# Patient Record
Sex: MALE | Race: WHITE | NOT HISPANIC OR LATINO | Employment: FULL TIME | URBAN - METROPOLITAN AREA
[De-identification: names, ages, dates, MRNs, and addresses within clinical notes are randomized per-mention and may not be internally consistent; named-entity substitution may affect disease eponyms.]

---

## 2017-02-20 DIAGNOSIS — F90.0 ATTENTION DEFICIT HYPERACTIVITY DISORDER (ADHD), PREDOMINANTLY INATTENTIVE TYPE: Primary | ICD-10-CM

## 2017-02-20 NOTE — TELEPHONE ENCOUNTER
Patient called and left a msg requesting a refill of his Adderall - he did not spell his name or give  but I found him by phone.   He wanted to pick it up today - I called him back and left a msg that he must leave the spelling of his name and . Also the [facility] is not in until tomorrow.    He did not say for 3 months but it looks like that is what he has been getting.    HE WILL BE DUE FOR A OV BEFORE THE NEXT REFILL    Pending Prescriptions:                       Disp   Refills    amphetamine-dextroamphetamine (ADDERALL X*30 cap*0            Sig: Take 1 capsule (20 mg) by mouth daily    amphetamine-dextroamphetamine (ADDERALL X*30 cap*0            Sig: Take 1 capsule (20 mg) by mouth daily    amphetamine-dextroamphetamine (ADDERALL X*30 cap*0            Sig: Take 1 capsule (20 mg) by mouth daily    Please return to me and I will call the patient back at [phone]. Thank-You,  Olga

## 2017-02-21 RX ORDER — DEXTROAMPHETAMINE SACCHARATE, AMPHETAMINE ASPARTATE MONOHYDRATE, DEXTROAMPHETAMINE SULFATE AND AMPHETAMINE SULFATE 5; 5; 5; 5 MG/1; MG/1; MG/1; MG/1
20 CAPSULE, EXTENDED RELEASE ORAL DAILY
Qty: 30 CAPSULE | Refills: 0 | Status: SHIPPED | OUTPATIENT
Start: 2017-03-23 | End: 2017-04-22

## 2017-02-21 RX ORDER — DEXTROAMPHETAMINE SACCHARATE, AMPHETAMINE ASPARTATE MONOHYDRATE, DEXTROAMPHETAMINE SULFATE AND AMPHETAMINE SULFATE 5; 5; 5; 5 MG/1; MG/1; MG/1; MG/1
20 CAPSULE, EXTENDED RELEASE ORAL DAILY
Qty: 30 CAPSULE | Refills: 0 | Status: SHIPPED | OUTPATIENT
Start: 2017-04-23 | End: 2017-05-23

## 2017-02-21 RX ORDER — DEXTROAMPHETAMINE SACCHARATE, AMPHETAMINE ASPARTATE MONOHYDRATE, DEXTROAMPHETAMINE SULFATE AND AMPHETAMINE SULFATE 5; 5; 5; 5 MG/1; MG/1; MG/1; MG/1
20 CAPSULE, EXTENDED RELEASE ORAL DAILY
Qty: 30 CAPSULE | Refills: 0 | Status: ON HOLD | OUTPATIENT
Start: 2017-02-21 | End: 2017-07-07

## 2017-04-13 NOTE — TELEPHONE ENCOUNTER
Pt was only given one month. The other 2 months were still in the Rx book. They are still in the book to be picked up if needed.

## 2017-07-05 ENCOUNTER — OFFICE VISIT (OUTPATIENT)
Dept: FAMILY MEDICINE | Facility: CLINIC | Age: 27
End: 2017-07-05

## 2017-07-05 VITALS
OXYGEN SATURATION: 98 % | WEIGHT: 190 LBS | TEMPERATURE: 98 F | RESPIRATION RATE: 16 BRPM | HEART RATE: 76 BPM | SYSTOLIC BLOOD PRESSURE: 120 MMHG | BODY MASS INDEX: 26.6 KG/M2 | HEIGHT: 71 IN | DIASTOLIC BLOOD PRESSURE: 80 MMHG

## 2017-07-05 DIAGNOSIS — R10.84 ABDOMINAL PAIN, GENERALIZED: Primary | ICD-10-CM

## 2017-07-05 DIAGNOSIS — Z71.89 ACP (ADVANCE CARE PLANNING): ICD-10-CM

## 2017-07-05 DIAGNOSIS — F90.0 ATTENTION DEFICIT HYPERACTIVITY DISORDER (ADHD), PREDOMINANTLY INATTENTIVE TYPE: Primary | ICD-10-CM

## 2017-07-05 LAB
ERYTHROCYTE [DISTWIDTH] IN BLOOD BY AUTOMATED COUNT: 11.5 %
HCT VFR BLD AUTO: 49.4 % (ref 40–53)
HEMOGLOBIN: 17.4 G/DL (ref 13.3–17.7)
MCH RBC QN AUTO: 32 PG (ref 26–33)
MCHC RBC AUTO-ENTMCNC: 35.2 G/DL (ref 31–36)
MCV RBC AUTO: 90.8 FL (ref 78–100)
PLATELET COUNT - QUEST: 235 10^9/L (ref 150–375)
RBC # BLD AUTO: 5.44 10*12/L (ref 4.4–5.9)
WBC # BLD AUTO: 11.4 10*9/L (ref 4–11)

## 2017-07-05 PROCEDURE — 99213 OFFICE O/P EST LOW 20 MIN: CPT | Performed by: PHYSICIAN ASSISTANT

## 2017-07-05 PROCEDURE — 85027 COMPLETE CBC AUTOMATED: CPT | Performed by: PHYSICIAN ASSISTANT

## 2017-07-05 PROCEDURE — 36415 COLL VENOUS BLD VENIPUNCTURE: CPT | Performed by: PHYSICIAN ASSISTANT

## 2017-07-05 NOTE — TELEPHONE ENCOUNTER
Pt was given Adderall refills back in Feb and only received 1 of the 3 rx's (Please see note in chart) Pt was in for another appointment and asked about it so I checked the book and they are no longer there and there were NOT picked up. Can you please ok those 2 months. Pt is a wear that you are out of the office until next Monday the 10th    Pending Prescriptions:                       Disp   Refills    amphetamine-dextroamphetamine (ADDERALL X*30 cap*0            Sig: Take 1 capsule (20 mg) by mouth daily    amphetamine-dextroamphetamine (ADDERALL X*30 cap*0            Sig: Take 1 capsule (30 mg) by mouth daily    Please return to me and I will call the patient back at   Telephone Information:   Mobile [phone]   Mobile [phone]     . Thank-You,  Olga

## 2017-07-05 NOTE — MR AVS SNAPSHOT
After Visit Summary   7/5/2017    Bassam Jacobsen    MRN: [id number]           Patient Information     Date Of Birth          1990        Visit Information        Provider Department      7/5/2017 3:30 PM Diana Irvin PA-C [facility]        Today's Diagnoses     Abdominal pain, generalized    -  1    ACP (advance care planning)           Follow-ups after your visit        Follow-up notes from your care team     Return if symptoms worsen or fail to improve.      Who to contact     If you have questions or need follow up information about today's clinic visit or your schedule please contact [facility] directly at [phone].  Normal or non-critical lab and imaging results will be communicated to you by MyChart, letter or phone within 4 business days after the clinic has received the results. If you do not hear from us within 7 days, please contact the clinic through [facility] or phone. If you have a critical or abnormal lab result, we will notify you by phone as soon as possible.  Submit refill requests through [facility] or call your pharmacy and they will forward the refill request to us. Please allow 3 business days for your refill to be completed.          Additional Information About Your Visit        MyChart Information     [facility] gives you secure access to your electronic health record. If you see a primary care provider, you can also send messages to your care team and make appointments. If you have questions, please call your primary care clinic.  If you do not have a primary care provider, please call [phone] and they will assist you.        Care EveryWhere ID     This is your Care EveryWhere ID. This could be used by other organizations to access your [place] medical records  [id number]        Your Vitals Were     Pulse Temperature Respirations Height Pulse Oximetry BMI (Body Mass Index)    76 98  F (36.7  C) 16 1.791 m (5'  "10.5\") 98% 26.88 kg/m2       Blood Pressure from Last 3 Encounters:   07/05/17 120/80   10/26/16 112/80   06/24/15 120/80    Weight from Last 3 Encounters:   07/05/17 86.2 kg (190 lb)   10/26/16 83.5 kg (184 lb)   06/24/15 83.5 kg (184 lb)              We Performed the Following     HEMOGRAM/PLATELET (BFP)     VENOUS COLLECTION        Primary Care Provider Office Phone # Fax #    Morgan Valdez [phone]       [facility] [address]  [facility] [address]        Equal Access to Services     HERMILA LÓPEZ : Hadii yonis lr hadasho Soomaali, waaxda luqadaha, qaybta kaalmada adeegyada, stefano dee . So [facility] [phone].    ATENCIÓN: Si habla español, tiene a hare disposición servicios gratuitos de asistencia lingüística. Llame al [phone].    We comply with applicable federal civil rights laws and [place] laws. We do not discriminate on the basis of race, color, national origin, age, disability sex, sexual orientation or gender identity.            Thank you!     Thank you for choosing [facility]  for your care. Our goal is always to provide you with excellent care. Hearing back from our patients is one way we can continue to improve our services. Please take a few minutes to complete the written survey that you may receive in the mail after your visit with us. Thank you!             Your Updated Medication List - Protect others around you: Learn how to safely use, store and throw away your medicines at www.disposemymeds.org.          This list is accurate as of: 7/5/17  4:48 PM.  Always use your most recent med list.                   Brand Name Dispense Instructions for use Diagnosis    amphetamine-dextroamphetamine 20 MG per 24 hr capsule    ADDERALL XR    30 capsule    Take 1 capsule (20 mg) by mouth daily    Attention deficit hyperactivity disorder (ADHD), predominantly inattentive type         "

## 2017-07-05 NOTE — PROGRESS NOTES
"CC: Abdominal pain    History:  Ady is here today after having frequent BMs since Sunday. Small amounts of loose stool. This seems to be getting better though, as he has had only had 1 BM today. No blood. Would get rumbling and cramping leading up to BM, and then sometimes it would get better after BM and other times it would get worse. Feels like even having a cracker he would have to go to the bathroom.     Today tried to eat breakfast today, and go to work, but had to clock out of work due to the pain. Went home and ate a bite of chicken, some beans/rice, and some crackers, and quickly after that he threw up.     No fever, sweats, chills.     Tried antacid tablets at work. Wife bought him PeptoBismol but hasn't tried yet.     PMH, MEDICATIONS, ALLERGIES, SOCIAL AND FAMILY HISTORY in [facility] and reviewed by me personally.      ROS negative other than the symptoms noted above in the HPI.        Examination   /80 (BP Location: Left arm, Cuff Size: Adult Large)  Pulse 76  Temp 98  F (36.7  C)  Resp 16  Ht 1.791 m (5' 10.5\")  Wt 86.2 kg (190 lb)  SpO2 98%  BMI 26.88 kg/m2       Constitutional: Sitting comfortably, in no acute distress. Vital signs noted  Eyes: pupils equal round reactive to light and accomodation, extra ocular movements intact  Cardiovascular:  regular rate and rhythm, no murmurs, clicks, or gallops  Respiratory:  normal respiratory rate and rhythm, lungs clear to auscultation  Abdomen: Abdomen soft, mild to moderate tenderness over lower abdomen. McBurneys point positive. Psoas sign negative. Rovsings sign positive. BS normal. No masses, organomegaly  SKIN: No jaundice/pallor/rash.   Psychiatric: mentation appears normal and affect normal/bright        A/P    ICD-10-CM    1. Abdominal pain, generalized R10.84 HEMOGRAM/PLATELET (BFP)     VENOUS COLLECTION   2. ACP (advance care planning) Z71.89        DISCUSSION: CBC today is within normal limits, other than very mildly elevated WBC count. " Given recent diarrhea this is more consistent with a infectious process, however, I did warn Ady that this could be early appendicitis. For infectious process recommended clear fluids for 24 hours. If his symptoms worsen where he is feel hot or getting more abdominal pain especially in right lower abdomen he should go immediately to ER. He will contact me first thing tomorrow if not getting better, and can consider CT to r/o appendicitis.    follow up visit: As needed    [facility]

## 2017-07-05 NOTE — NURSING NOTE
Patient is here for abd pain that started on Monday - lower abd pain - No pain with urination or frequency - Sunday evening he went out to eat with friends and after that he had to have bowel movements frequently and they were loss but never  much at all. Also vomited today after eating    Pre-Visit Screening :  Immunizations : up to date  Asthma Action Plan/Test : na  PHQ9/GAD7 : na    Pulse - regular  My Chart - accepts    CLASSIFICATION OF OVERWEIGHT AND OBESITY BY BMI                         Obesity Class           BMI(kg/m2)  Underweight                                    < 18.5  Normal                                         18.5-24.9  Overweight                                     25.0-29.9  OBESITY                     I                  30.0-34.9                              II                 35.0-39.9  EXTREME OBESITY             III                >40                             Patient's  BMI Body mass index is 26.88 kg/(m^2).  http://hin.nhlbi.nih.gov/menuplanner/menu.cgi  Questioned patient about current smoking habits.  Pt. has never smoked.

## 2017-07-05 NOTE — LETTER
[facility]  [facility] [address]  [place]  [address]  [phone]              Return to Work Release    Date: 7/5/2017      Name: Bassam Jacobsen                       YOB: 1990        The patient was seen at [facility]    Please excuse from all work responsibilities 7/5/2017 and 7/6/2017.    He may return to work without restrictions 7/7/2017.                _________________________  Diana Irvin PA-C

## 2017-07-06 ENCOUNTER — TELEPHONE (OUTPATIENT)
Dept: FAMILY MEDICINE | Facility: CLINIC | Age: 27
End: 2017-07-06

## 2017-07-06 ENCOUNTER — ANESTHESIA (OUTPATIENT)
Dept: SURGERY | Facility: CLINIC | Age: 27
DRG: 340 | End: 2017-07-06
Payer: COMMERCIAL

## 2017-07-06 ENCOUNTER — TRANSFERRED RECORDS (OUTPATIENT)
Dept: HEALTH INFORMATION MANAGEMENT | Facility: CLINIC | Age: 27
End: 2017-07-06

## 2017-07-06 ENCOUNTER — ANESTHESIA EVENT (OUTPATIENT)
Dept: SURGERY | Facility: CLINIC | Age: 27
DRG: 340 | End: 2017-07-06
Payer: COMMERCIAL

## 2017-07-06 ENCOUNTER — HOSPITAL ENCOUNTER (INPATIENT)
Facility: CLINIC | Age: 27
LOS: 2 days | Discharge: HOME | DRG: 340 | End: 2017-07-09
Attending: PHYSICIAN ASSISTANT | Admitting: SURGERY
Payer: COMMERCIAL

## 2017-07-06 DIAGNOSIS — K35.32 ACUTE PERFORATED APPENDICITIS: Primary | ICD-10-CM

## 2017-07-06 DIAGNOSIS — K35.30 ACUTE APPENDICITIS WITH LOCALIZED PERITONITIS: ICD-10-CM

## 2017-07-06 PROCEDURE — 99222 1ST HOSP IP/OBS MODERATE 55: CPT | Mod: 57 | Performed by: SURGERY

## 2017-07-06 PROCEDURE — 71000013 ZZH RECOVERY PHASE 1 LEVEL 1 EA ADDTL HR: Performed by: SURGERY

## 2017-07-06 PROCEDURE — 25000566 ZZH SEVOFLURANE, EA 15 MIN: Performed by: SURGERY

## 2017-07-06 PROCEDURE — 0DTJ4ZZ RESECTION OF APPENDIX, PERCUTANEOUS ENDOSCOPIC APPROACH: ICD-10-PCS | Performed by: SURGERY

## 2017-07-06 PROCEDURE — 40000306 ZZH STATISTIC PRE PROC ASSESS II: Performed by: SURGERY

## 2017-07-06 PROCEDURE — 25000128 H RX IP 250 OP 636: Performed by: EMERGENCY MEDICINE

## 2017-07-06 PROCEDURE — 27210794 ZZH OR GENERAL SUPPLY STERILE: Performed by: SURGERY

## 2017-07-06 PROCEDURE — 37000009 ZZH ANESTHESIA TECHNICAL FEE, EACH ADDTL 15 MIN: Performed by: SURGERY

## 2017-07-06 PROCEDURE — 71000012 ZZH RECOVERY PHASE 1 LEVEL 1 FIRST HR: Performed by: SURGERY

## 2017-07-06 PROCEDURE — 96375 TX/PRO/DX INJ NEW DRUG ADDON: CPT

## 2017-07-06 PROCEDURE — 36000056 ZZH SURGERY LEVEL 3 1ST 30 MIN: Performed by: SURGERY

## 2017-07-06 PROCEDURE — 25000128 H RX IP 250 OP 636: Performed by: NURSE ANESTHETIST, CERTIFIED REGISTERED

## 2017-07-06 PROCEDURE — 25000125 ZZHC RX 250: Performed by: NURSE ANESTHETIST, CERTIFIED REGISTERED

## 2017-07-06 PROCEDURE — 37000008 ZZH ANESTHESIA TECHNICAL FEE, 1ST 30 MIN: Performed by: SURGERY

## 2017-07-06 PROCEDURE — 96374 THER/PROPH/DIAG INJ IV PUSH: CPT

## 2017-07-06 PROCEDURE — 36000058 ZZH SURGERY LEVEL 3 EA 15 ADDTL MIN: Performed by: SURGERY

## 2017-07-06 PROCEDURE — 25000128 H RX IP 250 OP 636: Performed by: PHYSICIAN ASSISTANT

## 2017-07-06 PROCEDURE — 99285 EMERGENCY DEPT VISIT HI MDM: CPT

## 2017-07-06 PROCEDURE — 25000128 H RX IP 250 OP 636: Performed by: ANESTHESIOLOGY

## 2017-07-06 PROCEDURE — 44970 LAPAROSCOPY APPENDECTOMY: CPT | Performed by: SURGERY

## 2017-07-06 RX ORDER — LIDOCAINE HYDROCHLORIDE 10 MG/ML
INJECTION, SOLUTION INFILTRATION; PERINEURAL PRN
Status: DISCONTINUED | OUTPATIENT
Start: 2017-07-06 | End: 2017-07-07

## 2017-07-06 RX ORDER — ONDANSETRON 2 MG/ML
4 INJECTION INTRAMUSCULAR; INTRAVENOUS ONCE
Status: COMPLETED | OUTPATIENT
Start: 2017-07-06 | End: 2017-07-06

## 2017-07-06 RX ORDER — SODIUM CHLORIDE, SODIUM LACTATE, POTASSIUM CHLORIDE, CALCIUM CHLORIDE 600; 310; 30; 20 MG/100ML; MG/100ML; MG/100ML; MG/100ML
INJECTION, SOLUTION INTRAVENOUS CONTINUOUS
Status: DISCONTINUED | OUTPATIENT
Start: 2017-07-06 | End: 2017-07-07 | Stop reason: HOSPADM

## 2017-07-06 RX ORDER — DEXAMETHASONE SODIUM PHOSPHATE 4 MG/ML
INJECTION, SOLUTION INTRA-ARTICULAR; INTRALESIONAL; INTRAMUSCULAR; INTRAVENOUS; SOFT TISSUE PRN
Status: DISCONTINUED | OUTPATIENT
Start: 2017-07-06 | End: 2017-07-07

## 2017-07-06 RX ORDER — ONDANSETRON 2 MG/ML
INJECTION INTRAMUSCULAR; INTRAVENOUS PRN
Status: DISCONTINUED | OUTPATIENT
Start: 2017-07-06 | End: 2017-07-07

## 2017-07-06 RX ORDER — FENTANYL CITRATE 50 UG/ML
INJECTION, SOLUTION INTRAMUSCULAR; INTRAVENOUS PRN
Status: DISCONTINUED | OUTPATIENT
Start: 2017-07-06 | End: 2017-07-07

## 2017-07-06 RX ORDER — HYDROMORPHONE HYDROCHLORIDE 1 MG/ML
.5-1 INJECTION, SOLUTION INTRAMUSCULAR; INTRAVENOUS; SUBCUTANEOUS
Status: DISCONTINUED | OUTPATIENT
Start: 2017-07-06 | End: 2017-07-07

## 2017-07-06 RX ORDER — LIDOCAINE 40 MG/G
CREAM TOPICAL
Status: DISCONTINUED | OUTPATIENT
Start: 2017-07-06 | End: 2017-07-07 | Stop reason: HOSPADM

## 2017-07-06 RX ORDER — GLYCOPYRROLATE 0.2 MG/ML
INJECTION, SOLUTION INTRAMUSCULAR; INTRAVENOUS PRN
Status: DISCONTINUED | OUTPATIENT
Start: 2017-07-06 | End: 2017-07-07

## 2017-07-06 RX ORDER — PROPOFOL 10 MG/ML
INJECTION, EMULSION INTRAVENOUS PRN
Status: DISCONTINUED | OUTPATIENT
Start: 2017-07-06 | End: 2017-07-07

## 2017-07-06 RX ADMIN — GLYCOPYRROLATE 0.2 MG: 0.2 INJECTION, SOLUTION INTRAMUSCULAR; INTRAVENOUS at 23:33

## 2017-07-06 RX ADMIN — SODIUM CHLORIDE, POTASSIUM CHLORIDE, SODIUM LACTATE AND CALCIUM CHLORIDE: 600; 310; 30; 20 INJECTION, SOLUTION INTRAVENOUS at 22:50

## 2017-07-06 RX ADMIN — LIDOCAINE HYDROCHLORIDE 30 MG: 10 INJECTION, SOLUTION INFILTRATION; PERINEURAL at 23:33

## 2017-07-06 RX ADMIN — DEXAMETHASONE SODIUM PHOSPHATE 4 MG: 4 INJECTION, SOLUTION INTRA-ARTICULAR; INTRALESIONAL; INTRAMUSCULAR; INTRAVENOUS; SOFT TISSUE at 23:33

## 2017-07-06 RX ADMIN — PROPOFOL 200 MG: 10 INJECTION, EMULSION INTRAVENOUS at 23:33

## 2017-07-06 RX ADMIN — ONDANSETRON 4 MG: 2 INJECTION INTRAMUSCULAR; INTRAVENOUS at 21:22

## 2017-07-06 RX ADMIN — ROCURONIUM BROMIDE 50 MG: 10 INJECTION INTRAVENOUS at 23:33

## 2017-07-06 RX ADMIN — ONDANSETRON 4 MG: 2 INJECTION INTRAMUSCULAR; INTRAVENOUS at 23:52

## 2017-07-06 RX ADMIN — Medication 1 MG: at 21:22

## 2017-07-06 RX ADMIN — TAZOBACTAM SODIUM AND PIPERACILLIN SODIUM 3.38 G: 375; 3 INJECTION, SOLUTION INTRAVENOUS at 22:22

## 2017-07-06 RX ADMIN — FENTANYL CITRATE 250 MCG: 50 INJECTION, SOLUTION INTRAMUSCULAR; INTRAVENOUS at 23:33

## 2017-07-06 ASSESSMENT — ENCOUNTER SYMPTOMS
ABDOMINAL PAIN: 1
FEVER: 1
DIARRHEA: 1

## 2017-07-06 NOTE — IP AVS SNAPSHOT
[facility]    [address]    [facility] [address]    Phone:  [phone]    Fax:  [phone]                                       After Visit Summary   7/6/2017    Bassam Jacobsen    MRN: [id number]           After Visit Summary Signature Page     I have received my discharge instructions, and my questions have been answered. I have discussed any challenges I see with this plan with the nurse or doctor.    ..........................................................................................................................................  Patient/Patient Representative Signature      ..........................................................................................................................................  Patient Representative Print Name and Relationship to Patient    ..................................................               ................................................  Date                                            Time    ..........................................................................................................................................  Reviewed by Signature/Title    ...................................................              ..............................................  Date                                                            Time

## 2017-07-06 NOTE — TELEPHONE ENCOUNTER
Bassam is calling stating that he vomited yesterday after he saw you but was able to keep some soup and some water down.  He would like you to call and speak with him when you can today    Patient's phone #[phone]

## 2017-07-06 NOTE — IP AVS SNAPSHOT
MRN:[id number]                      After Visit Summary   7/6/2017    Bassam Jacobsen    MRN: [id number]           Thank you!     Thank you for choosing [facility] for your care. Our goal is always to provide you with excellent care. Hearing back from our patients is one way we can continue to improve our services. Please take a few minutes to complete the written survey that you may receive in the mail after you visit. If you would like to speak to someone directly about your visit please contact Patient Relations at [phone]. Thank you!          Patient Information     Date Of Birth          1990        Designated Caregiver       Most Recent Value    Caregiver    Will someone help with your care after discharge? no      About your hospital stay     You were admitted on:  July 7, 2017 You last received care in the:  [facility]    You were discharged on:  July 9, 2017       Who to Call     For medical emergencies, please call 911.  For non-urgent questions about your medical care, please call your primary care provider or clinic, [phone]  For questions related to your surgery, please call your surgery clinic        Attending Provider     Provider Specialty    Olga Josue PA-C Physician Assistant    Christiano Swain MD General Surgery       Primary Care Provider Office Phone # Fax #    Morgan Terrance Valdez [phone]      Further instructions from your care team       HOME CARE FOLLOWING APPENDECTOMY  IRAJ Pope, YOANDY Silverio D. Maurer, ADRIÁN Mcbride    INCISIONAL CARE:  Replace the bandage over your incision (or incisions) until all drainage stops, or if more comfortable to have in place.  If present, leave the steri-strips (white paper tapes) in place for 14 days after surgery.  If you have staples in your incision at the time of discharge, they will be removed at your  follow-up appointment.  If Dermabond (a type of skin glue) is present, leave in place until it wears/flakes off.     BATHING:  Avoid baths for 1 week after surgery.  Showers are okay.  You may wash your hair at any time.  Gently pat your incision dry after bathing.    ACTIVITY:  Light Activity -- you may immediately be up and about as tolerated.  Driving -- you may drive when comfortable and off narcotic pain medications.  Light Work -- resume when comfortable off pain medications.  (If you can drive, you probably can work.)  Strenuous Work/Activity -- limit lifting to 20 pounds for 1 week.  Progressively increase with time.  Active Sports (running, biking, etc.) -- cautiously resume after 2 weeks.    DISCOMFORT:  Use pain medications as prescribed by your surgeon.  Take the pain medication with some food, when possible, to minimize side effects.  Intermittent use of ice packs at the incision sites may help during the first 48 hours.  Expect gradual improvement.    DIET:  No restrictions.  Drink plenty of fluids.  While taking pain medications, increase dietary fiber or add a fiber supplementation like Metamucil or Citrucel to help prevent constipation - a possible side effect of pain medications.    NAUSEA:  If nauseated from the anesthetic/pain meds; rest in bed, get up cautiously with assistance, and drink clear liquids (juice, tea, broth).    RETURN APPOINTMENT:  Schedule a follow-up visit 2-3 weeks post-op.  Office Phone:  [phone]     CONTACT US IF THE FOLLOWING DEVELOPS:   1. A fever that is above 101     2. If there is a large amount of drainage, bleeding, or swelling.   3. Severe pain that is not relieved by your prescription.   4. Drainage that is thick, cloudy, yellow, green or white.   5. Any other questions not answered by  Frequently Asked Questions  sheet.      FREQUENTLY ASKED QUESTIONS:    Q:  How should my incision look?    A:  Normally your incision will appear slightly swollen with light  redness directly along the incision itself as it heals.  It may feel like a bump or ridge as the healing/scarring happens, and over time (3-4 months) this bump or ridge feeling should slowly go away.  In general, clear or pink watery drainage can be normal at first as your incision heals, but should decrease over time.    Q:  How do I know if my incision is infected?  A:  Look at your incision for signs of infection, like redness around the incision spreading to surrounding skin, or drainage of cloudy or foul-smelling drainage.  If you feel warm, check your temperature to see if you are running a fever.    **If any of these things occur, please notify the nurse at our office.  We may need you to come into the office for an incision check.      Q:  How do I take care of my incision?  A:  If you have a dressing in place - Starting the day after surgery, replace the dressing 1-2 times a day until there is no further drainage from the incision.  At that time, a dressing is no longer needed.  Try to minimize tape on the skin if irritation is occurring at the tape sites.  If you have significant irritation from tape on the skin, please call the office to discuss other method of dressing your incision.    Small pieces of tape called  steri-strips  may be present directly overlying your incision; these may be removed 10 days after surgery unless otherwise specified by your surgeon.  If these tapes start to loosen at the ends, you may trim them back until they fall off or are removed.    A:  If you had  Dermabond  tissue glue used as a dressing (this causes your incision to look shiny with a clear covering over it) - This type of dressing wears off with time and does not require more dressings over the top unless it is draining around the glue as it wears off.  Do not apply ointments or lotions over the incisions until the glue has completely worn off.    Q:  There is a piece of tape or a sticky  lead  still on my skin.  Can  I remove this?  A:  Sometimes the sticky  leads  used for monitoring during surgery or for evaluation in the emergency department are not all removed while you are in the hospital.  These sometimes have a tab or metal dot on them.  You can easily remove these on your own, like taking off a band-aid.  If there is a gel substance under the  lead , simply wipe/clean it off with a washcloth or paper towel.      Q:  What can I do to minimize constipation (very hard stools, or lack of stools)?  A:  Stay well hydrated.  Increase your dietary fiber intake or take a fiber supplement -with plenty of water.  Walk around frequently.  You may consider an over-the-counter stool-softener.  Your Pharmacist can assist you with choosing one that is stocked at your pharmacy.  Constipation is also one of the most common side effects of pain medication.  If you are using pain medication, be pro-active and try to PREVENT problems with constipation by taking the steps above BEFORE constipation becomes a problem.    Q:  What do I do if I need more pain medications?  A:  Call the office to receive refills.  Be aware that certain pain meds cannot be called into a pharmacy and actually require a paper prescription.  A change may be made in your pain med as you progress thru your recovery period or if you have side effects to certain meds.    --Pain meds are NOT refilled after 5pm on weekdays, and NOT AT ALL on the weekends, so please look ahead to prevent problems.      Q:  Why am I having a hard time sleeping now that I am at home?  A:  Many medications you receive while you are in the hospital can impact your sleep for a number of days after your surgery/hospitalization.  Decreased level of activity and naps during the day may also make sleeping at night difficult.  Try to minimize day-time naps, and get up frequently during the day to walk around your home during your recovery time.  Sleep aides may be of some help, but are not recommended  for long-term use.      Q:  I am having some back discomfort.  What should I do?  A:  This may be related to certain positioning that was required for your surgery, extended periods of time in bed, or other changes in your overall activity level.  You may try ice, heat, acetaminophen, or ibuprofen to treat this temporarily.  Note that many pain medications have acetaminophen in them and would state this on the prescription bottle.  Be sure not to exceed the maximum of 4000mg per day of acetaminophen.     **If the pain you are having does not resolve, is severe, or is a flare of back pain you have had on other occasions prior to surgery, please contact your primary physician for further recommendations or for an appointment to be examined at their office.    Q:  Why am I having headaches?  A:  Headaches can be caused by many things:  caffeine withdrawal, use of pain meds, dehydration, high blood pressure, lack of sleep, over-activity/exhaustion, flare-up of usual migraine headaches.  If you feel this is related to muscle tension (a band-like feeling around the head, or a pressure at the low-back of the head) you may try ice or heat to this area.  You may need to drink more fluids (try electrolyte drink like Gatorade), rest, or take your usual migraine medications.   **If your headaches do not resolve, worsen, are accompanied by other symptoms, or if your blood pressure is high, please call your primary physician for recommendation and/or examination.    Q:  I am unable to urinate.  What do I do?  A:  A small percentage of people can have difficulty urinating initially after surgery.  This includes being able to urinate only a very small amount at a time and feeling discomfort or pressure in the very low abdomen.  This is called  urinary retention , and is actually an urgent situation.  Proceed to your nearest Emergency department for evaluation (not an Urgent Care Center).  Sometimes the bladder does not work  "correctly after certain medications you receive during surgery, or related to certain procedures.  You may need to have a catheter placed until your bladder recovers.  When planning to go to an Emergency department, it may help to call the ER to let them know you are coming in for this problem after a surgery.  This may help you get in quicker to be evaluated.  **If you have symptoms of a urinary tract infection, please contact your primary physician for the proper evaluation and treatment.          If you have other questions, please call the office Monday thru Friday between 8am and 5pm to discuss with the nurse or physician assistant.  #[phone]    There is a surgeon ON CALL on weekday evenings and over the weekend in case of urgent need only, and may be contacted at the same number.    If you are having an emergency, call 911 or proceed to your nearest emergency department.            Pending Results     Date and Time Order Name Status Description    7/7/2017 0007 Surgical pathology exam In process             Statement of Approval     Ordered          07/09/17 1435  I have reviewed and agree with all the recommendations and orders detailed in this document.  EFFECTIVE NOW     Approved and electronically signed by:  Christiano Swain MD             Admission Information     Date & Time Provider Department Dept. Phone    7/6/2017 Christiano Swain MD [facility] [phone]      Your Vitals Were     Blood Pressure Pulse Temperature Respirations Height Weight    124/72 101 98  F (36.7  C) (Oral) 16 1.803 m (5' 11\") 86.2 kg (190 lb)    Pulse Oximetry BMI (Body Mass Index)                94% 26.5 kg/m2          [facility] Information     [employer] gives you secure access to your electronic health record. If you see a primary care provider, you can also send messages to your care team and make appointments. If you have questions, please call your primary care clinic.  If you do not have a primary " care provider, please call [phone] and they will assist you.        Care EveryWhere ID     This is your Care EveryWhere ID. This could be used by other organizations to access your [facility] medical records  [id number]        Equal Access to Services     DAVID LÓPEZ AH: Hadii aad ku hadajit Sogaetano, wabettieda luqadaha, qaybta kaalmada aderob, stefano lissin hayaacesar vazquezjossie bejarano luiz kemp. So [facility] [phone].    ATENCIÓN: Si habla español, tiene a hare disposición servicios gratuitos de asistencia lingüística. Llame al [phone].    We comply with applicable federal civil rights laws and [place] laws. We do not discriminate on the basis of race, color, national origin, age, disability sex, sexual orientation or gender identity.               Review of your medicines      START taking        Dose / Directions    amoxicillin-clavulanate 875-125 MG per tablet   Commonly known as:  AUGMENTIN        Dose:  1 tablet   Take 1 tablet by mouth 2 times daily for 7 days   Quantity:  14 tablet   Refills:  0       oxyCODONE 5 MG IR tablet   Commonly known as:  ROXICODONE        Dose:  5-10 mg   Take 1-2 tablets (5-10 mg) by mouth every 3 hours as needed for moderate to severe pain   Quantity:  30 tablet   Refills:  0            Where to get your medicines      These medications were sent to [facility]/pharmacy #[id number] - [facility] [address]  [address] [facility] [address]     Phone:  [phone]     amoxicillin-clavulanate 875-125 MG per tablet         Some of these will need a paper prescription and others can be bought over the counter. Ask your nurse if you have questions.     Bring a paper prescription for each of these medications     oxyCODONE 5 MG IR tablet                Protect others around you: Learn how to safely use, store and throw away your medicines at www.disposemymeds.org.             Medication List: This is a list of all your medications and when to take them. Check marks below  indicate your daily home schedule. Keep this list as a reference.      Medications           Morning Afternoon Evening Bedtime As Needed    amoxicillin-clavulanate 875-125 MG per tablet   Commonly known as:  AUGMENTIN   Take 1 tablet by mouth 2 times daily for 7 days                                oxyCODONE 5 MG IR tablet   Commonly known as:  ROXICODONE   Take 1-2 tablets (5-10 mg) by mouth every 3 hours as needed for moderate to severe pain   Last time this was given:  10 mg on 7/8/2017  4:44 AM

## 2017-07-07 PROBLEM — K35.32 ACUTE PERFORATED APPENDICITIS: Status: ACTIVE | Noted: 2017-07-07

## 2017-07-07 LAB
CREAT SERPL-MCNC: 0.95 MG/DL (ref 0.66–1.25)
GFR SERPL CREATININE-BSD FRML MDRD: NORMAL ML/MIN/1.7M2
PLATELET # BLD AUTO: 166 10E9/L (ref 150–450)

## 2017-07-07 PROCEDURE — 25000128 H RX IP 250 OP 636: Performed by: SURGERY

## 2017-07-07 PROCEDURE — 88304 TISSUE EXAM BY PATHOLOGIST: CPT | Performed by: SURGERY

## 2017-07-07 PROCEDURE — 96376 TX/PRO/DX INJ SAME DRUG ADON: CPT

## 2017-07-07 PROCEDURE — 88304 TISSUE EXAM BY PATHOLOGIST: CPT | Mod: 26 | Performed by: SURGERY

## 2017-07-07 PROCEDURE — 25000132 ZZH RX MED GY IP 250 OP 250 PS 637: Performed by: SURGERY

## 2017-07-07 PROCEDURE — S0020 INJECTION, BUPIVICAINE HYDRO: HCPCS | Performed by: SURGERY

## 2017-07-07 PROCEDURE — 25000128 H RX IP 250 OP 636: Performed by: ANESTHESIOLOGY

## 2017-07-07 PROCEDURE — 25000125 ZZHC RX 250: Performed by: SURGERY

## 2017-07-07 PROCEDURE — 25000125 ZZHC RX 250: Performed by: NURSE ANESTHETIST, CERTIFIED REGISTERED

## 2017-07-07 PROCEDURE — 12000010 ZZH R&B MS INTERMEDIATE OVERFLOW

## 2017-07-07 PROCEDURE — 85049 AUTOMATED PLATELET COUNT: CPT | Performed by: SURGERY

## 2017-07-07 PROCEDURE — 82565 ASSAY OF CREATININE: CPT | Performed by: SURGERY

## 2017-07-07 PROCEDURE — 25000132 ZZH RX MED GY IP 250 OP 250 PS 637: Performed by: PHYSICIAN ASSISTANT

## 2017-07-07 PROCEDURE — 25800025 ZZH RX 258: Performed by: SURGERY

## 2017-07-07 PROCEDURE — 36415 COLL VENOUS BLD VENIPUNCTURE: CPT | Performed by: SURGERY

## 2017-07-07 PROCEDURE — 25000128 H RX IP 250 OP 636: Performed by: NURSE ANESTHETIST, CERTIFIED REGISTERED

## 2017-07-07 RX ORDER — LIDOCAINE 40 MG/G
CREAM TOPICAL
Status: DISCONTINUED | OUTPATIENT
Start: 2017-07-07 | End: 2017-07-09 | Stop reason: HOSPADM

## 2017-07-07 RX ORDER — SODIUM CHLORIDE, SODIUM LACTATE, POTASSIUM CHLORIDE, CALCIUM CHLORIDE 600; 310; 30; 20 MG/100ML; MG/100ML; MG/100ML; MG/100ML
INJECTION, SOLUTION INTRAVENOUS CONTINUOUS
Status: DISCONTINUED | OUTPATIENT
Start: 2017-07-07 | End: 2017-07-07 | Stop reason: HOSPADM

## 2017-07-07 RX ORDER — ACETAMINOPHEN 325 MG/1
650 TABLET ORAL EVERY 4 HOURS PRN
Status: DISCONTINUED | OUTPATIENT
Start: 2017-07-10 | End: 2017-07-09 | Stop reason: HOSPADM

## 2017-07-07 RX ORDER — ACETAMINOPHEN 325 MG/1
975 TABLET ORAL EVERY 8 HOURS
Status: DISCONTINUED | OUTPATIENT
Start: 2017-07-07 | End: 2017-07-09 | Stop reason: HOSPADM

## 2017-07-07 RX ORDER — HYDROMORPHONE HYDROCHLORIDE 1 MG/ML
.3-.5 INJECTION, SOLUTION INTRAMUSCULAR; INTRAVENOUS; SUBCUTANEOUS EVERY 10 MIN PRN
Status: DISCONTINUED | OUTPATIENT
Start: 2017-07-07 | End: 2017-07-07 | Stop reason: HOSPADM

## 2017-07-07 RX ORDER — OXYCODONE HYDROCHLORIDE 5 MG/1
5-10 TABLET ORAL
Status: DISCONTINUED | OUTPATIENT
Start: 2017-07-07 | End: 2017-07-09 | Stop reason: HOSPADM

## 2017-07-07 RX ORDER — DIPHENHYDRAMINE HYDROCHLORIDE 50 MG/ML
25 INJECTION INTRAMUSCULAR; INTRAVENOUS EVERY 6 HOURS PRN
Status: DISCONTINUED | OUTPATIENT
Start: 2017-07-07 | End: 2017-07-09 | Stop reason: HOSPADM

## 2017-07-07 RX ORDER — PROMETHAZINE HYDROCHLORIDE 25 MG/ML
6.25 INJECTION, SOLUTION INTRAMUSCULAR; INTRAVENOUS
Status: DISCONTINUED | OUTPATIENT
Start: 2017-07-07 | End: 2017-07-07 | Stop reason: HOSPADM

## 2017-07-07 RX ORDER — DIPHENHYDRAMINE HCL 25 MG
25 CAPSULE ORAL EVERY 6 HOURS PRN
Status: DISCONTINUED | OUTPATIENT
Start: 2017-07-07 | End: 2017-07-09 | Stop reason: HOSPADM

## 2017-07-07 RX ORDER — FENTANYL CITRATE 50 UG/ML
25-50 INJECTION, SOLUTION INTRAMUSCULAR; INTRAVENOUS
Status: DISCONTINUED | OUTPATIENT
Start: 2017-07-07 | End: 2017-07-07 | Stop reason: HOSPADM

## 2017-07-07 RX ORDER — BUPIVACAINE HYDROCHLORIDE 5 MG/ML
INJECTION, SOLUTION EPIDURAL; INTRACAUDAL PRN
Status: DISCONTINUED | OUTPATIENT
Start: 2017-07-07 | End: 2017-07-07 | Stop reason: HOSPADM

## 2017-07-07 RX ORDER — ONDANSETRON 4 MG/1
4 TABLET, ORALLY DISINTEGRATING ORAL EVERY 30 MIN PRN
Status: DISCONTINUED | OUTPATIENT
Start: 2017-07-07 | End: 2017-07-07 | Stop reason: HOSPADM

## 2017-07-07 RX ORDER — ONDANSETRON 2 MG/ML
4 INJECTION INTRAMUSCULAR; INTRAVENOUS EVERY 6 HOURS PRN
Status: DISCONTINUED | OUTPATIENT
Start: 2017-07-07 | End: 2017-07-09 | Stop reason: HOSPADM

## 2017-07-07 RX ORDER — NALOXONE HYDROCHLORIDE 0.4 MG/ML
.1-.4 INJECTION, SOLUTION INTRAMUSCULAR; INTRAVENOUS; SUBCUTANEOUS
Status: DISCONTINUED | OUTPATIENT
Start: 2017-07-07 | End: 2017-07-07 | Stop reason: HOSPADM

## 2017-07-07 RX ORDER — ONDANSETRON 2 MG/ML
4 INJECTION INTRAMUSCULAR; INTRAVENOUS EVERY 30 MIN PRN
Status: DISCONTINUED | OUTPATIENT
Start: 2017-07-07 | End: 2017-07-07 | Stop reason: HOSPADM

## 2017-07-07 RX ORDER — PROCHLORPERAZINE MALEATE 5 MG
5-10 TABLET ORAL EVERY 6 HOURS PRN
Status: DISCONTINUED | OUTPATIENT
Start: 2017-07-07 | End: 2017-07-09 | Stop reason: HOSPADM

## 2017-07-07 RX ORDER — NALOXONE HYDROCHLORIDE 0.4 MG/ML
.1-.4 INJECTION, SOLUTION INTRAMUSCULAR; INTRAVENOUS; SUBCUTANEOUS
Status: DISCONTINUED | OUTPATIENT
Start: 2017-07-07 | End: 2017-07-09 | Stop reason: HOSPADM

## 2017-07-07 RX ORDER — ONDANSETRON 4 MG/1
4 TABLET, ORALLY DISINTEGRATING ORAL EVERY 6 HOURS PRN
Status: DISCONTINUED | OUTPATIENT
Start: 2017-07-07 | End: 2017-07-09 | Stop reason: HOSPADM

## 2017-07-07 RX ORDER — NEOSTIGMINE METHYLSULFATE 1 MG/ML
VIAL (ML) INJECTION PRN
Status: DISCONTINUED | OUTPATIENT
Start: 2017-07-07 | End: 2017-07-07

## 2017-07-07 RX ORDER — IBUPROFEN 600 MG/1
600 TABLET, FILM COATED ORAL EVERY 6 HOURS PRN
Status: DISCONTINUED | OUTPATIENT
Start: 2017-07-07 | End: 2017-07-09 | Stop reason: HOSPADM

## 2017-07-07 RX ORDER — ALBUTEROL SULFATE 0.83 MG/ML
2.5 SOLUTION RESPIRATORY (INHALATION) EVERY 4 HOURS PRN
Status: DISCONTINUED | OUTPATIENT
Start: 2017-07-07 | End: 2017-07-07 | Stop reason: HOSPADM

## 2017-07-07 RX ORDER — DEXTROSE MONOHYDRATE, SODIUM CHLORIDE, AND POTASSIUM CHLORIDE 50; 1.49; 4.5 G/1000ML; G/1000ML; G/1000ML
INJECTION, SOLUTION INTRAVENOUS CONTINUOUS
Status: DISCONTINUED | OUTPATIENT
Start: 2017-07-07 | End: 2017-07-09 | Stop reason: HOSPADM

## 2017-07-07 RX ORDER — MEPERIDINE HYDROCHLORIDE 25 MG/ML
12.5 INJECTION INTRAMUSCULAR; INTRAVENOUS; SUBCUTANEOUS
Status: DISCONTINUED | OUTPATIENT
Start: 2017-07-07 | End: 2017-07-07 | Stop reason: HOSPADM

## 2017-07-07 RX ORDER — HYDROMORPHONE HYDROCHLORIDE 1 MG/ML
.3-.5 INJECTION, SOLUTION INTRAMUSCULAR; INTRAVENOUS; SUBCUTANEOUS
Status: DISCONTINUED | OUTPATIENT
Start: 2017-07-07 | End: 2017-07-09 | Stop reason: HOSPADM

## 2017-07-07 RX ORDER — FENTANYL CITRATE 50 UG/ML
25-50 INJECTION, SOLUTION INTRAMUSCULAR; INTRAVENOUS
Status: DISCONTINUED | OUTPATIENT
Start: 2017-07-07 | End: 2017-07-07

## 2017-07-07 RX ADMIN — TAZOBACTAM SODIUM AND PIPERACILLIN SODIUM 3.38 G: 375; 3 INJECTION, SOLUTION INTRAVENOUS at 09:58

## 2017-07-07 RX ADMIN — GLYCOPYRROLATE 0.6 MG: 0.2 INJECTION, SOLUTION INTRAMUSCULAR; INTRAVENOUS at 00:15

## 2017-07-07 RX ADMIN — Medication 4 MG: at 00:15

## 2017-07-07 RX ADMIN — OXYCODONE HYDROCHLORIDE 5 MG: 5 TABLET ORAL at 14:23

## 2017-07-07 RX ADMIN — ACETAMINOPHEN 975 MG: 325 TABLET, FILM COATED ORAL at 18:04

## 2017-07-07 RX ADMIN — POTASSIUM CHLORIDE, DEXTROSE MONOHYDRATE AND SODIUM CHLORIDE: 150; 5; 450 INJECTION, SOLUTION INTRAVENOUS at 02:33

## 2017-07-07 RX ADMIN — SODIUM CHLORIDE, POTASSIUM CHLORIDE, SODIUM LACTATE AND CALCIUM CHLORIDE: 600; 310; 30; 20 INJECTION, SOLUTION INTRAVENOUS at 00:04

## 2017-07-07 RX ADMIN — OXYCODONE HYDROCHLORIDE 5 MG: 5 TABLET ORAL at 22:30

## 2017-07-07 RX ADMIN — TAZOBACTAM SODIUM AND PIPERACILLIN SODIUM 3.38 G: 375; 3 INJECTION, SOLUTION INTRAVENOUS at 15:49

## 2017-07-07 RX ADMIN — HYDROMORPHONE HYDROCHLORIDE 0.5 MG: 1 INJECTION, SOLUTION INTRAMUSCULAR; INTRAVENOUS; SUBCUTANEOUS at 03:31

## 2017-07-07 RX ADMIN — IBUPROFEN 600 MG: 600 TABLET ORAL at 21:11

## 2017-07-07 RX ADMIN — HYDROMORPHONE HYDROCHLORIDE 0.5 MG: 1 INJECTION, SOLUTION INTRAMUSCULAR; INTRAVENOUS; SUBCUTANEOUS at 07:53

## 2017-07-07 RX ADMIN — IBUPROFEN 600 MG: 600 TABLET ORAL at 12:52

## 2017-07-07 RX ADMIN — TAZOBACTAM SODIUM AND PIPERACILLIN SODIUM 3.38 G: 375; 3 INJECTION, SOLUTION INTRAVENOUS at 03:33

## 2017-07-07 RX ADMIN — ACETAMINOPHEN 975 MG: 325 TABLET, FILM COATED ORAL at 09:58

## 2017-07-07 RX ADMIN — TAZOBACTAM SODIUM AND PIPERACILLIN SODIUM 3.38 G: 375; 3 INJECTION, SOLUTION INTRAVENOUS at 22:01

## 2017-07-07 RX ADMIN — FENTANYL CITRATE 50 MCG: 50 INJECTION INTRAMUSCULAR; INTRAVENOUS at 01:50

## 2017-07-07 RX ADMIN — POTASSIUM CHLORIDE, DEXTROSE MONOHYDRATE AND SODIUM CHLORIDE: 150; 5; 450 INJECTION, SOLUTION INTRAVENOUS at 12:52

## 2017-07-07 NOTE — PLAN OF CARE
Problem: Goal Outcome Summary  Goal: Goal Outcome Summary  Afebrile. Pt states his abdominal pain is controlled with Tylenol and Ibuprofen. Abdomen rounded. Bowel sounds active. Denies passing flatus. Tolerating clear liquids; plan to advance diet as tolerated. Ambulating hallways independently. Continues to receive IV antibiotics. Will continue to monitor and provide for needs.

## 2017-07-07 NOTE — OP NOTE
General Surgery Operative Note    Pre-operative diagnosis:  appendicitis   Post-operative diagnosis:  perforated appendicitis    Procedure: Laparoscopic Appendectomy   Surgeon: Christiano Swain MD   Assistant(s): NONE   Anesthesia: General    Estimated blood loss: 5 cc's   Drains placed: None   Complications:  None   Findings:   perforated appendix with significant surrounding inflammation.  There was minimal spillage of appendiceal contents.       Indications for operation: This is a 27-year-old gentleman who presents with a two day history of abdominal pain preceded by several days of diarrhea.  CT scan at an outside urgent care revealed an enlarged appendix with significant surrounding inflammation.  Laparoscopic appendectomy was recommended, and the procedure, along with its risks and complications, was discussed with the patient.  He agreed to proceed.    Details of the operation: After informed consent, the patient was taken to the operating room where he underwent satisfactory induction of general anesthesia.  The patient was sterilely prepped and draped and a supraumbilical skin incision was made.  Dissection was carried bluntly down to the fascia, which was opened using electrocautery.  The Givens trocar was introduced and fixed in place using stay sutures.  Pneumoperitoneum was achieved using CO2 insufflation, and under direct visualization, two additional 5 mm ports were placed.  There was significant inflammation in the right lower quadrant and fairly widespread fibrinous exudate around the cecum.  Dense inflammatory adhesions were noted.  These were taken down bluntly, exposing the appendix with a necrotic midportion.  The base of the appendix was viable.  A window was made in the mesentery and an Endo ARLEEN stapler was used to divide the mesoappendix.  A second load was now used to come across the base of the appendix.  The appendix was placed in an Endo Catch bag and brought out through the  supraumbilical incision.  The abdomen was now irrigated out with normal saline.  The staple lines were visualized and were seen to have excellent hemostasis.  Trocar sites were infiltrated with 0.5% Marcaine and the trochars were removed under direct visualization.  The supraumbilical fascia was closed using interrupted 0 Vicryl sutures and the skin incisions were closed using 4-0 subcuticular Vicryl followed by Steri-Strips.    The patient tolerated the procedure well and was transferred to the recovery room in satisfactory condition.  Sponge and needle counts were correct at the close of the case.    Specimens:   ID Type Source Tests Collected by Time Destination   A :  Tissue Appendix SURGICAL PATHOLOGY EXAM Christiano Swain MD 7/7/2017 12:07 AM            Christiano Swain MD

## 2017-07-07 NOTE — ANESTHESIA PREPROCEDURE EVALUATION
Anesthesia Evaluation     .             ROS/MED HX    ENT/Pulmonary:  - neg pulmonary ROS     Neurologic:  - neg neurologic ROS     Cardiovascular: Comment: PDA repair        METS/Exercise Tolerance:     Hematologic:  - neg hematologic  ROS       Musculoskeletal:  - neg musculoskeletal ROS       GI/Hepatic:  - neg GI/hepatic ROS       Renal/Genitourinary:  - ROS Renal section negative       Endo:  - neg endo ROS       Psychiatric:  - neg psychiatric ROS       Infectious Disease:  - neg infectious disease ROS       Malignancy:         Other: Comment: .Lab Test        07/05/17                       1616          WBC          11.4*         HGB          17.4          MCV          90.8          PLT          235            No lab results found.     - neg other ROS                 Physical Exam  Normal systems: cardiovascular and pulmonary    Airway   Mallampati: II    Dental     Cardiovascular   Rhythm and rate: regular and normal      Pulmonary    breath sounds clear to auscultation                    Anesthesia Plan      History & Physical Review  History and physical reviewed and following examination; no interval change.    ASA Status:  1 .        Plan for General and ETT with Intravenous induction. Maintenance will be Inhalation and Balanced.    PONV prophylaxis:  Ondansetron (or other 5HT-3) and Dexamethasone or Solumedrol       Postoperative Care  Postoperative pain management:  IV analgesics, Oral pain medications and Multi-modal analgesia.      Consents  Anesthetic plan, risks, benefits and alternatives discussed with:  Patient or representative..                          .

## 2017-07-07 NOTE — ADDENDUM NOTE
Addendum  created 07/07/17 0103 by Joe Clancy, DO    Anesthesia Review and Sign - Ready for Procedure, Anesthesia Review and Sign - Signed, Sign clinical note

## 2017-07-07 NOTE — PHARMACY-ADMISSION MEDICATION HISTORY
Admission medication history interview status for this patient is complete. See [facility] admission navigator for allergy information, prior to admission medications and immunization status.     Medication history interview source(s):Patient and Family  Medication history resources (including written lists, pill bottles, clinic record):[facility]  Primary pharmacy:[facility]    Changes made to [facility] medication list:  Added: none  Deleted: Adderall 20 mg XR daily. Patient has problem with insurance and has not taken this medications for some months.  Changed: As above    Actions taken by pharmacist (provider contacted, etc): As above    Additional medication history information:None    Medication reconciliation/reorder completed by provider prior to medication history? Yes    For patients on insulin therapy: NO   Lantus/levemir/NPH/Mix 70/30 dose: _____ in AM/PM or twice daily   Sliding scale Novolog Y/N   If Yes, do you have a baseline novolog pre-meal dose: ______units with meals   Patients eat three meals a day: Y/N   Any Barriers to therapy: cost of medications/comfortable with giving injections (if applicable)/ comfortable and confident with current diabetes regimen     Prior to Admission medications    Not on File

## 2017-07-07 NOTE — ANESTHESIA POSTPROCEDURE EVALUATION
Patient: Bassam Jacobsen    Procedure(s):  LAPAROSCOPIC APPENDECTOMY - Wound Class: III-Contaminated    Diagnosis:appendicitis  Diagnosis Additional Information: perforated appendicitis      Anesthesia Type:  General, ETT    Note:  Anesthesia Post Evaluation    Patient location during evaluation: PACU  Patient participation: Able to fully participate in evaluation  Level of consciousness: awake  Pain management: adequate  Airway patency: patent  Cardiovascular status: acceptable  Respiratory status: acceptable  Hydration status: acceptable  PONV: controlled     Anesthetic complications: None          Last vitals:  Vitals:    07/07/17 0035 07/07/17 0040 07/07/17 0045   BP: 117/68 116/70 117/67   Pulse:      Resp: 15 15 15   Temp:      SpO2: 100% 99% 98%         Electronically Signed By: Joe Clancy DO  July 7, 2017  12:54 AM

## 2017-07-07 NOTE — PLAN OF CARE
Problem: Goal Outcome Summary  Goal: Goal Outcome Summary  Outcome: No Change  Afebrile. VSS. O2 sats 97% on 3 LPM via NC. Sats dip to high 80's in RA. Pain rated 2/10 at incision sites controlled with Dilaudid. Abdomen rounded, taut, tender. BS hypoactive. Dressing C/D/I. Ice applied. Tolerating ice chips. C/O sore throat. Voiding. Denies flatus. Tolerated ambulation to bathroom. Appeared to sleep intermittently between cares.

## 2017-07-07 NOTE — H&P
"[facility]  [facility] - H&P     Bassam Jacobsen MRN# [id number]   Age: 27 year old YOB: 1990     HPI:  Patient has been experiencing acute RLQ abdominal pain for the past 2 days associated with fever, nausea and anorexia.  These symptoms have been increasing in severity.  The patient had diarrhea from five days ago until yesterday.  He had relatively minimal pain at that time, but developed abdominal pain yesterday.  This began fairly centrally and moved to the right lower quadrant.  Patient was seen at an outside urgent care and was sent to the emergency room here in anticipation that an operating room and surgeon would be available.    History is obtained from the patient    Review Of Systems:  Respiratory: No shortness of breath, dyspnea on exertion, cough, or hemoptysis  Cardiovascular: negative  Gastrointestinal: as above  Genitourinary: negative    PMH:  History reviewed. No pertinent past medical history.    PSH:  Past Surgical History:   Procedure Laterality Date     ENT SURGERY       HEAD & NECK SURGERY       HERNIA REPAIR        REPAIR PATENT DUCT ARTERIOSUS         Allergies:  No Known Allergies    Home Medications:  No current outpatient prescriptions on file.       Social History:  Social History   Substance Use Topics     Smoking status: Never Smoker     Smokeless tobacco: Not on file     Alcohol use 1.0 oz/week     2 Standard drinks or equivalent per week       Family History:  No family history of cancer.    Objective:  /76  Pulse 101  Temp 99.3  F (37.4  C) (Temporal)  Resp 18  Ht 1.803 m (5' 11\")  Wt 86.2 kg (190 lb)  SpO2 93%  BMI 26.5 kg/m2    General appearance: healthy, alert and mild distress  Hydration: well hydrated  Neck: normal and supple  Lungs: normal and clear to auscultation  Heart: regular rate and rhythm and no murmurs, clicks, or gallops  Abdomen: rounded, soft.   Tenderness: present: RLQ moderate  Masses: " none  Organomegaly: none    Labs Reviewed:  Lab Results   Component Value Date    WBC 11.4 07/05/2017     Lab Results   Component Value Date    HGB 17.4 07/05/2017     Lab Results   Component Value Date     07/05/2017             Radiology:  CT abdomen reveals a dilated, thick-walled appendix with significant surrounding fat stranding.  There is a fecalith present in the appendix.  All imaging studies reviewed by me.  I have significant concerns based on the CT appearance for the possibility of a ruptured appendix.    ASSESSMENT/PLAN:  The patient's history, physical exam, laboratory and imaging studies are suspicious for acute appendicitis, and there is significant suspicion for potential perforation..  I have offered the patient laparoscopic appendectomy.  The risks, benefits, and alternatives have been discussed in detail.  We discussed the possibility of requiring a segmental colon resection.  All of the patient's questions have been answered.  They elect to proceed and we will go to the OR at the soonest availability.  Pre-operative antibiotics have been ordered.     Christiano Swain MD

## 2017-07-07 NOTE — ANESTHESIA CARE TRANSFER NOTE
Patient: Bassam Jacobsen    Procedure(s):  LAPAROSCOPIC APPENDECTOMY - Wound Class: III-Contaminated    Diagnosis: appendicitis  Diagnosis Additional Information: No value filed.    Anesthesia Type:   General, ETT     Note:  Airway :ETT  Patient transferred to:PACU  Comments: Pt SV Good Tidal Volumes.  Prepare to Transfer to PACU with ETT and O2.  Pt VSS Report to PACU RN, Transfer Care      Vitals: (Last set prior to Anesthesia Care Transfer)    CRNA VITALS  7/6/2017 2359 - 7/7/2017 0032      7/7/2017             Pulse: 67    SpO2: 97 %    Resp Rate (observed): 14                Electronically Signed By: MATEO Reece CRNA  July 7, 2017  12:32 AM

## 2017-07-07 NOTE — ED PROVIDER NOTES
"  History     Chief Complaint:  Abdominal Pain    HPI   Bassam Jacobsen is an otherwise healthy 27 year old male who presents to the emergency department today for evaluation of abdominal pain. The patient reports that pain started yesterday morning and was initially diffuse, but later localized to the right. He also had diarrhea as well. Today, pain has been increasing and was accompanied by a fever earlier today. He last ate before 1800 and presented to the [facility] afterwards. There, he had blood work and a CT scan done (with results below), and was then forwarded here for an appendectomy.    CT Abdomen Pelvis w contrast, [facility] 17:  Calcified appendicolith within the proximal lumen of the appendix. Appendix fluid-filled and dilated to 12 mm wide. Rather extensive inflammatory changes of the fat of the right pelvis centered around the appendix. Trace free pelvic fluid. No abscess.    CBC: WBC 15.7 (H) (HGB 16.6, )     Allergies:  No Known Drug Allergies    Medications:    The patient is currently on no regular medications.      Past Medical History:    History reviewed. No pertinent past medical history.    Past Surgical History:    ENT surgery  Heaad and neck surgery  Hernia repair   repair patent duct arteriosus    Family History:    History reviewed. No pertinent family history.     Social History:  The patient was accompanied to the ED by his family.  Smoking Status: Never smoker  Alcohol Use: Yes  Marital Status:   [2]     Review of Systems   Constitutional: Positive for fever.   Gastrointestinal: Positive for abdominal pain and diarrhea.   All other systems reviewed and are negative.    Physical Exam   Vitals:  Patient Vitals for the past 24 hrs:   BP Temp Temp src Pulse Resp SpO2 Height Weight   17 2106 123/90 99.6  F (37.6  C) Oral 106 18 99 % 1.803 m (5' 11\") 86.2 kg (190 lb)     Physical Exam  Nursing note and vitals reviewed. "     GENERAL: Alert, mild distress, non toxic appearing.   HEENT: Normal conjunctiva. No scleral icterus. MMM.   NECK: Supple.  CARDIAC: Normal rate and regular rhythm. Normal heart sounds. No murmurs, rubs, or gallops appreciated.  PULMONARY: CTA bilaterally. Normal breath sounds. No wheezing, crackles, or rhonchi appreciated.  ABDOMEN: Soft, non distended abdomen. Right lower quadrant tenderness. No rebound or guarding.   NEURO: Alert and oriented. Non-focal.   MUSCULOSKELETAL: Normal range of motion. No peripheral edema. No CVA tenderness.   SKIN: Skin is warm and dry. No rashes. No pallor or jaundice.   PSYCH: Normal affect and mood.     Emergency Department Course     Interventions:  2122 Dilaudid 1 mg IV  2122 Zofran 4 mg IV  2222 Zosyn 3.375 g IV    Emergency Department Course:  Nursing notes and vitals reviewed.  I performed an exam of the patient as documented above.   10:11 PM: I spoke with Dr. Swain of the General Surgery service from [facility] regarding patient's presentation, findings, and plan of care.  I discussed the treatment plan with the patient. They expressed understanding of this plan and consented to admission. I discussed the patient with Dr. Swain, who will admit the patient to the OR for further evaluation and treatment.    Impression & Plan      Medical Decision Making:  Bassam Jacobsen is a 27 year old male who presents with abdominal pain and the CT scan from [facility] today confirms appendicitis.  This is consistent with his clinical exam.  There is no evidence of rupture or abscess at this time. His pain has been controlled with interventions in the Emergency Department.  IV antibiotics started in the Emergency Department. The case was discussed with the on-call surgeon and the patient will be going to the operating room.  They will arrange inpatient observation bed following surgery. Patient is hemodynamically stable in ED. Patient and family are in agreement with plan.      Diagnosis:    ICD-10-CM    1. Acute appendicitis with localized peritonitis K35.3      Disposition:   Admission to operating room    Scribe Disclosure:  I, Joseph Lema, am serving as a scribe at 9:38 PM on 7/6/2017 to document services personally performed by Olga Josue PA-C, based on my observations and the provider's statements to me.    7/6/2017   [facility] EMERGENCY DEPARTMENT       Olga Josue PA-C  07/06/17 2765

## 2017-07-07 NOTE — ED NOTES
Pain started yesterday worse today diarrhea and vomiting    Went to [facility]   Ct done  Positive for appy   Here for further workup

## 2017-07-07 NOTE — PROGRESS NOTES
"[facility]   General Surgery Progress Note           Assessment and Plan:   Assessment:   POD#0 s/p laparoscopic appendectomy for perforated appendicitis      Plan:   -continue clear liquid diet, advance when + flatus  -abx:  IV Zosyn  -pain control: PO Tylenol, Ibuprofen.  IV Dilaudid also available  -VTE prophylaxis:  Lovenox  -DC home tomorrow if remains afebrile and + bowel function         Interval History:   Comfortable in bed, pain controlled.  Denies nausea or bloating.  Up walking halls this am.  Urinating normally.  Tolerating clear liquid diet.  No flatus yet.  Hopes to go home tomorrow.         Physical Exam:   Blood pressure 125/75, pulse 101, temperature 97.6  F (36.4  C), temperature source Oral, resp. rate 16, height 1.803 m (5' 11\"), weight 86.2 kg (190 lb), SpO2 95 %.    I/O last 3 completed shifts:  In: 1400 [I.V.:1400]  Out: 1300 [Urine:1300]    Abdomen:   soft,  + distended, tenderness noted in the right lower quadrant and hypoactive bowel sounds   Inc(s) - dressings intact                Data:     Recent Labs   Lab Test  07/05/17   1616   HGB  17.4   WBC  11.4*       Jenn Gerard PA-C     Seen and agree,    Christiano Swain MD  [facility]    "

## 2017-07-08 LAB — GLUCOSE BLDC GLUCOMTR-MCNC: 117 MG/DL (ref 70–99)

## 2017-07-08 PROCEDURE — 25000128 H RX IP 250 OP 636: Performed by: SURGERY

## 2017-07-08 PROCEDURE — 12000011 ZZH R&B MS OVERFLOW

## 2017-07-08 PROCEDURE — 00000146 ZZHCL STATISTIC GLUCOSE BY METER IP

## 2017-07-08 PROCEDURE — 25800025 ZZH RX 258: Performed by: SURGERY

## 2017-07-08 PROCEDURE — 25000132 ZZH RX MED GY IP 250 OP 250 PS 637: Performed by: SURGERY

## 2017-07-08 RX ADMIN — TAZOBACTAM SODIUM AND PIPERACILLIN SODIUM 3.38 G: 375; 3 INJECTION, SOLUTION INTRAVENOUS at 09:58

## 2017-07-08 RX ADMIN — ONDANSETRON 4 MG: 2 INJECTION INTRAMUSCULAR; INTRAVENOUS at 04:32

## 2017-07-08 RX ADMIN — TAZOBACTAM SODIUM AND PIPERACILLIN SODIUM 3.38 G: 375; 3 INJECTION, SOLUTION INTRAVENOUS at 16:01

## 2017-07-08 RX ADMIN — ACETAMINOPHEN 975 MG: 325 TABLET, FILM COATED ORAL at 11:49

## 2017-07-08 RX ADMIN — ACETAMINOPHEN 975 MG: 325 TABLET, FILM COATED ORAL at 04:03

## 2017-07-08 RX ADMIN — TAZOBACTAM SODIUM AND PIPERACILLIN SODIUM 3.38 G: 375; 3 INJECTION, SOLUTION INTRAVENOUS at 22:00

## 2017-07-08 RX ADMIN — HYDROMORPHONE HYDROCHLORIDE 0.5 MG: 1 INJECTION, SOLUTION INTRAMUSCULAR; INTRAVENOUS; SUBCUTANEOUS at 09:08

## 2017-07-08 RX ADMIN — POTASSIUM CHLORIDE, DEXTROSE MONOHYDRATE AND SODIUM CHLORIDE: 150; 5; 450 INJECTION, SOLUTION INTRAVENOUS at 16:02

## 2017-07-08 RX ADMIN — TAZOBACTAM SODIUM AND PIPERACILLIN SODIUM 3.38 G: 375; 3 INJECTION, SOLUTION INTRAVENOUS at 04:03

## 2017-07-08 RX ADMIN — ACETAMINOPHEN 975 MG: 325 TABLET, FILM COATED ORAL at 19:49

## 2017-07-08 RX ADMIN — OXYCODONE HYDROCHLORIDE 10 MG: 5 TABLET ORAL at 04:44

## 2017-07-08 NOTE — PLAN OF CARE
Problem: Individualization  Goal: Patient Preferences  Outcome: Improving  Passing gas.  Up and about in room.  Hypoactive bowel sounds.

## 2017-07-08 NOTE — PLAN OF CARE
Problem: Appendicitis/Appendectomy (Adult)  Goal: Signs and Symptoms of Listed Potential Problems Will be Absent or Manageable (Appendicitis/Appendectomy)  Signs and symptoms of listed potential problems will be absent or manageable by discharge/transition of care (reference Appendicitis/Appendectomy (Adult) CPG).   Outcome: Improving  Pt ambulating throughout hospital, drinking and tolerating full liquid diet in large amounts, passing flatus, positive bowel sounds, po analgesics and ice to incision effective for pain control, continue to reassure pt of progress and care for needs.

## 2017-07-08 NOTE — PROGRESS NOTES
"No N/V  Up walking halls.  Passed gas a few times.\"more than yesterday\".  Tummy distended.  Using I/S well.  "

## 2017-07-08 NOTE — PROGRESS NOTES
"[facility]   General Surgery Progress Note           Assessment and Plan:   Assessment:   POD#1 s/p laparoscopic appendectomy for perforated appendicitis  Ileus as expected      Plan:   -back to sips of clear liquids/ice chips  -abx:  IV Zosyn  -pain control: PO Tylenol, Ibuprofen.  IV Dilaudid also available  -VTE prophylaxis:  Lovenox  -await resolution of ileus         Interval History:   C/o diffuse abd pain today.  Began passing flatus yesterday, had turkey sandwich at bedtime.  + large emesis X 2 upon waking early this am.  Nausea controlled now.  Still some flatus.  No BM.           Physical Exam:   Blood pressure 140/80, pulse 101, temperature 98.2  F (36.8  C), temperature source Oral, resp. rate 16, height 1.803 m (5' 11\"), weight 86.2 kg (190 lb), SpO2 96 %.    I/O last 3 completed shifts:  In: 2474 [P.O.:2310; I.V.:164]  Out: 1 [Emesis/NG output:1]    Abdomen:   soft,  + distended, tenderness noted diffusely and rare bowel sounds   Inc(s) - cdi                Data:     Recent Labs   Lab Test  07/05/17   1616   HGB  17.4   WBC  11.4*       Jenn Gerard PA-C     Seen and agree,    Christiano Swain MD  [facility]    "

## 2017-07-08 NOTE — PLAN OF CARE
Problem: Goal Outcome Summary  Goal: Goal Outcome Summary  Outcome: Declining  R.N.  VSS, afebrile, abdomen is distended and firm, had 2 emesis tonight, zofran x 1 , oxycodone x 1, tylenol x 1, lungs clear, blood sugar is 117, more pain tonight with nausea, will continue to monitor closely and provide for needs, no bowel sounds heard

## 2017-07-09 VITALS
BODY MASS INDEX: 26.6 KG/M2 | SYSTOLIC BLOOD PRESSURE: 124 MMHG | OXYGEN SATURATION: 94 % | TEMPERATURE: 98 F | DIASTOLIC BLOOD PRESSURE: 72 MMHG | WEIGHT: 190 LBS | HEART RATE: 101 BPM | HEIGHT: 71 IN | RESPIRATION RATE: 16 BRPM

## 2017-07-09 PROCEDURE — 25000132 ZZH RX MED GY IP 250 OP 250 PS 637: Performed by: SURGERY

## 2017-07-09 PROCEDURE — 25000128 H RX IP 250 OP 636: Performed by: SURGERY

## 2017-07-09 PROCEDURE — 25800025 ZZH RX 258: Performed by: SURGERY

## 2017-07-09 RX ORDER — OXYCODONE HYDROCHLORIDE 5 MG/1
5-10 TABLET ORAL
Qty: 30 TABLET | Refills: 0 | Status: SHIPPED | OUTPATIENT
Start: 2017-07-09 | End: 2017-07-21

## 2017-07-09 RX ADMIN — ACETAMINOPHEN 975 MG: 325 TABLET, FILM COATED ORAL at 03:51

## 2017-07-09 RX ADMIN — TAZOBACTAM SODIUM AND PIPERACILLIN SODIUM 3.38 G: 375; 3 INJECTION, SOLUTION INTRAVENOUS at 15:28

## 2017-07-09 RX ADMIN — POTASSIUM CHLORIDE, DEXTROSE MONOHYDRATE AND SODIUM CHLORIDE: 150; 5; 450 INJECTION, SOLUTION INTRAVENOUS at 02:05

## 2017-07-09 RX ADMIN — TAZOBACTAM SODIUM AND PIPERACILLIN SODIUM 3.38 G: 375; 3 INJECTION, SOLUTION INTRAVENOUS at 03:52

## 2017-07-09 RX ADMIN — ACETAMINOPHEN 975 MG: 325 TABLET, FILM COATED ORAL at 14:12

## 2017-07-09 RX ADMIN — TAZOBACTAM SODIUM AND PIPERACILLIN SODIUM 3.38 G: 375; 3 INJECTION, SOLUTION INTRAVENOUS at 09:58

## 2017-07-09 RX ADMIN — POTASSIUM CHLORIDE, DEXTROSE MONOHYDRATE AND SODIUM CHLORIDE: 150; 5; 450 INJECTION, SOLUTION INTRAVENOUS at 13:44

## 2017-07-09 NOTE — PROGRESS NOTES
Tolerating full liquids and crackers.  Walking halls.  I/S well.  Bassam is hungry.  Ordered pancakes.

## 2017-07-09 NOTE — PLAN OF CARE
"Problem: Individualization  Goal: Patient Preferences  Outcome: Improving  \"I feel much better than yesterday.\"  Had 3 stools during the night 1 was formed      "

## 2017-07-09 NOTE — PLAN OF CARE
"Problem: Goal Outcome Summary  Goal: Goal Outcome Summary  Outcome: Improving  Pt tolerating NPO status with occasional ice chips and small sips of chicken broth, ambulating frequently throughout hospital, pt states he \"feels hungry\", continue to monitor and encourage rest between cares.       "

## 2017-07-09 NOTE — PROGRESS NOTES
Feels better.  Multiple BMs.    Afebrile.  Abdomen seems slightly distended.  Bowel sounds positive.    Incisions OK.    Will ADAT.  Home on ABx when tolerating diet.  Later today vs tomorrow.    Christiano Swain MD  [facility]

## 2017-07-09 NOTE — DISCHARGE INSTRUCTIONS
HOME CARE FOLLOWING APPENDECTOMY  IRAJ Pope E. Gavin, N. Guttormson, D. Maurer, COURTNEY Martin, ADRIÁN Davis    INCISIONAL CARE:  Replace the bandage over your incision (or incisions) until all drainage stops, or if more comfortable to have in place.  If present, leave the steri-strips (white paper tapes) in place for 14 days after surgery.  If you have staples in your incision at the time of discharge, they will be removed at your follow-up appointment.  If Dermabond (a type of skin glue) is present, leave in place until it wears/flakes off.     BATHING:  Avoid baths for 1 week after surgery.  Showers are okay.  You may wash your hair at any time.  Gently pat your incision dry after bathing.    ACTIVITY:  Light Activity -- you may immediately be up and about as tolerated.  Driving -- you may drive when comfortable and off narcotic pain medications.  Light Work -- resume when comfortable off pain medications.  (If you can drive, you probably can work.)  Strenuous Work/Activity -- limit lifting to 20 pounds for 1 week.  Progressively increase with time.  Active Sports (running, biking, etc.) -- cautiously resume after 2 weeks.    DISCOMFORT:  Use pain medications as prescribed by your surgeon.  Take the pain medication with some food, when possible, to minimize side effects.  Intermittent use of ice packs at the incision sites may help during the first 48 hours.  Expect gradual improvement.    DIET:  No restrictions.  Drink plenty of fluids.  While taking pain medications, increase dietary fiber or add a fiber supplementation like Metamucil or Citrucel to help prevent constipation - a possible side effect of pain medications.    NAUSEA:  If nauseated from the anesthetic/pain meds; rest in bed, get up cautiously with assistance, and drink clear liquids (juice, tea, broth).    RETURN APPOINTMENT:  Schedule a follow-up visit 2-3 weeks post-op.  Office Phone:  [phone]     CONTACT US IF THE FOLLOWING  DEVELOPS:   1. A fever that is above 101     2. If there is a large amount of drainage, bleeding, or swelling.   3. Severe pain that is not relieved by your prescription.   4. Drainage that is thick, cloudy, yellow, green or white.   5. Any other questions not answered by  Frequently Asked Questions  sheet.      FREQUENTLY ASKED QUESTIONS:    Q:  How should my incision look?    A:  Normally your incision will appear slightly swollen with light redness directly along the incision itself as it heals.  It may feel like a bump or ridge as the healing/scarring happens, and over time (3-4 months) this bump or ridge feeling should slowly go away.  In general, clear or pink watery drainage can be normal at first as your incision heals, but should decrease over time.    Q:  How do I know if my incision is infected?  A:  Look at your incision for signs of infection, like redness around the incision spreading to surrounding skin, or drainage of cloudy or foul-smelling drainage.  If you feel warm, check your temperature to see if you are running a fever.    **If any of these things occur, please notify the nurse at our office.  We may need you to come into the office for an incision check.      Q:  How do I take care of my incision?  A:  If you have a dressing in place - Starting the day after surgery, replace the dressing 1-2 times a day until there is no further drainage from the incision.  At that time, a dressing is no longer needed.  Try to minimize tape on the skin if irritation is occurring at the tape sites.  If you have significant irritation from tape on the skin, please call the office to discuss other method of dressing your incision.    Small pieces of tape called  steri-strips  may be present directly overlying your incision; these may be removed 10 days after surgery unless otherwise specified by your surgeon.  If these tapes start to loosen at the ends, you may trim them back until they fall off or are removed.     A:  If you had  Dermabond  tissue glue used as a dressing (this causes your incision to look shiny with a clear covering over it) - This type of dressing wears off with time and does not require more dressings over the top unless it is draining around the glue as it wears off.  Do not apply ointments or lotions over the incisions until the glue has completely worn off.    Q:  There is a piece of tape or a sticky  lead  still on my skin.  Can I remove this?  A:  Sometimes the sticky  leads  used for monitoring during surgery or for evaluation in the emergency department are not all removed while you are in the hospital.  These sometimes have a tab or metal dot on them.  You can easily remove these on your own, like taking off a band-aid.  If there is a gel substance under the  lead , simply wipe/clean it off with a washcloth or paper towel.      Q:  What can I do to minimize constipation (very hard stools, or lack of stools)?  A:  Stay well hydrated.  Increase your dietary fiber intake or take a fiber supplement -with plenty of water.  Walk around frequently.  You may consider an over-the-counter stool-softener.  Your Pharmacist can assist you with choosing one that is stocked at your pharmacy.  Constipation is also one of the most common side effects of pain medication.  If you are using pain medication, be pro-active and try to PREVENT problems with constipation by taking the steps above BEFORE constipation becomes a problem.    Q:  What do I do if I need more pain medications?  A:  Call the office to receive refills.  Be aware that certain pain meds cannot be called into a pharmacy and actually require a paper prescription.  A change may be made in your pain med as you progress thru your recovery period or if you have side effects to certain meds.    --Pain meds are NOT refilled after 5pm on weekdays, and NOT AT ALL on the weekends, so please look ahead to prevent problems.      Q:  Why am I having a hard time  sleeping now that I am at home?  A:  Many medications you receive while you are in the hospital can impact your sleep for a number of days after your surgery/hospitalization.  Decreased level of activity and naps during the day may also make sleeping at night difficult.  Try to minimize day-time naps, and get up frequently during the day to walk around your home during your recovery time.  Sleep aides may be of some help, but are not recommended for long-term use.      Q:  I am having some back discomfort.  What should I do?  A:  This may be related to certain positioning that was required for your surgery, extended periods of time in bed, or other changes in your overall activity level.  You may try ice, heat, acetaminophen, or ibuprofen to treat this temporarily.  Note that many pain medications have acetaminophen in them and would state this on the prescription bottle.  Be sure not to exceed the maximum of 4000mg per day of acetaminophen.     **If the pain you are having does not resolve, is severe, or is a flare of back pain you have had on other occasions prior to surgery, please contact your primary physician for further recommendations or for an appointment to be examined at their office.    Q:  Why am I having headaches?  A:  Headaches can be caused by many things:  caffeine withdrawal, use of pain meds, dehydration, high blood pressure, lack of sleep, over-activity/exhaustion, flare-up of usual migraine headaches.  If you feel this is related to muscle tension (a band-like feeling around the head, or a pressure at the low-back of the head) you may try ice or heat to this area.  You may need to drink more fluids (try electrolyte drink like Gatorade), rest, or take your usual migraine medications.   **If your headaches do not resolve, worsen, are accompanied by other symptoms, or if your blood pressure is high, please call your primary physician for recommendation and/or examination.    Q:  I am unable to  urinate.  What do I do?  A:  A small percentage of people can have difficulty urinating initially after surgery.  This includes being able to urinate only a very small amount at a time and feeling discomfort or pressure in the very low abdomen.  This is called  urinary retention , and is actually an urgent situation.  Proceed to your nearest Emergency department for evaluation (not an Urgent Care Center).  Sometimes the bladder does not work correctly after certain medications you receive during surgery, or related to certain procedures.  You may need to have a catheter placed until your bladder recovers.  When planning to go to an Emergency department, it may help to call the ER to let them know you are coming in for this problem after a surgery.  This may help you get in quicker to be evaluated.  **If you have symptoms of a urinary tract infection, please contact your primary physician for the proper evaluation and treatment.          If you have other questions, please call the office Monday thru Friday between 8am and 5pm to discuss with the nurse or physician assistant.  #[phone]    There is a surgeon ON CALL on weekday evenings and over the weekend in case of urgent need only, and may be contacted at the same number.    If you are having an emergency, call 911 or proceed to your nearest emergency department.

## 2017-07-09 NOTE — PLAN OF CARE
Problem: Goal Outcome Summary  Goal: Goal Outcome Summary  Outcome: Adequate for Discharge Date Met:  07/09/17  Pt ambulating frequently throughout hospital, tolerating regular diet in small amounts, drinking and voiding well, pain controlled with po analgesics, f/u and d/c instructions discussed with pt and spouse, questions answered, medication given to pt.

## 2017-07-09 NOTE — PLAN OF CARE
Problem: Goal Outcome Summary  Goal: Goal Outcome Summary  Outcome: Improving  R.N.  VSS, afebrile, abdomen still softly distended with hypoactive bowel sounds, had a small stool and passed large amount of flatus and stated that he felt much better after that, resting well, taking tylenol for pain, rating it a 2, will continue to monitor closely and provide for needs

## 2017-07-10 ENCOUNTER — CARE COORDINATION (OUTPATIENT)
Dept: FAMILY MEDICINE | Facility: CLINIC | Age: 27
End: 2017-07-10

## 2017-07-10 LAB — COPATH REPORT: NORMAL

## 2017-07-10 RX ORDER — DEXTROAMPHETAMINE SACCHARATE, AMPHETAMINE ASPARTATE MONOHYDRATE, DEXTROAMPHETAMINE SULFATE AND AMPHETAMINE SULFATE 5; 5; 5; 5 MG/1; MG/1; MG/1; MG/1
20 CAPSULE, EXTENDED RELEASE ORAL DAILY
Qty: 30 CAPSULE | Refills: 0 | Status: SHIPPED | OUTPATIENT
Start: 2017-08-05 | End: 2018-10-05

## 2017-07-10 RX ORDER — DEXTROAMPHETAMINE SACCHARATE, AMPHETAMINE ASPARTATE MONOHYDRATE, DEXTROAMPHETAMINE SULFATE AND AMPHETAMINE SULFATE 7.5; 7.5; 7.5; 7.5 MG/1; MG/1; MG/1; MG/1
30 CAPSULE, EXTENDED RELEASE ORAL DAILY
Qty: 30 CAPSULE | Refills: 0 | Status: SHIPPED | OUTPATIENT
Start: 2017-07-10 | End: 2018-10-05

## 2017-07-10 NOTE — PROGRESS NOTES
Care Coordination Assessment    PCP: Morgan Valdez    Referral Source:  ED/IP List    Clinical Data: Patient discharged from inpatient at [facility] 07/09/2017 with acute perforated appendicitis.  Patient discharged home with instructions to follow up with surgeon to check incision.      Plan: I will forward this to Rupinder to assess and assist with appropriate follow up

## 2017-07-11 NOTE — PROGRESS NOTES
I called the patient and LVM wishing him well and asking that he contact us if he needs anything from us or if we can help in any way.    Rupinder Mireles, CMA

## 2017-07-12 ENCOUNTER — TELEPHONE (OUTPATIENT)
Dept: SURGERY | Facility: CLINIC | Age: 27
End: 2017-07-12

## 2017-07-12 NOTE — DISCHARGE SUMMARY
[facility]    Discharge Summary  Surgery    Date of Admission:  7/6/2017  Date of Discharge:  7/9/2017  4:42 PM  Discharging Provider: Christiano Swain MD  Discharge Summary Note completed by: Eric Phelps PA-C on 7/12/2017  Date of Service: The patient was personally seen by Discharging Providers on the day of discharge.    Discharge Diagnoses   S/p laparoscopic appendectomy for perforated appendicitis.    Procedure/Surgery Information   Procedure(s):  LAPAROSCOPIC APPENDECTOMY - Wound Class: IV-Dirty or Infected   Surgeon(s) and Role:     * Christiano Swain MD - Primary     Specimens:   ID Type Source Tests Collected by Time Destination   A :  Tissue Appendix SURGICAL PATHOLOGY EXAM Christiano Swain MD 7/7/2017 12:07 AM       Non-operative procedures: None performed       History of Present Illness   Bassam Jacobsen is a 27-year-old gentleman who presents with a two day history of abdominal pain preceded by several days of diarrhea. CT scan at an outside urgent care revealed an enlarged appendix with significant surrounding inflammation.  Laparoscopic appendectomy was recommended, and the procedure, along with its risks and complications, was discussed with the patient.  He agreed to proceed.    Hospital Course   Bassam Jacobsen was admitted on 7/6/2017.  The following problems were addressed during his hospitalization:  Patient Active Problem List   Diagnosis     Acute perforated appendicitis       Post-operative antibiotic therapy included: Zosyn.  Post-operative pain control: was via IV until able to tolerate PO intake and transitioned to PO pain meds.    Remarkable hospital course events: None, he recovered as anticipated. Bassam met all criteria for release on 7/9/2017  4:42 PM.  He was afebrile, tolerating diet, pain controlled on PO meds, ambulating well, and had return of bowel function.    Medications discontinued or adjusted during this hospitalization: see discharge med list  below.    Antibiotics prescribed at discharge: Augmentin, Duration: 7 days     Imaging study follow up needs:   -No studies require specific follow-up    Discharge Instructions and Follow-Up:  Discharge diet: Regular   Discharge activity: Activity as tolerated   Discharge follow-up: Follow up with me in 1-3 weeks   Wound/Incision care: May get incision wet in shower but do not soak or scrub       Eric Phelps PA-C      Discharge Disposition   Discharged to home   Condition at discharge: Stable    Pending Results   Final pathology results: Acute appendicitis with features consistent with perforation.    Unresulted Labs Ordered in the Past 30 Days of this Admission     No orders found from 5/7/2017 to 7/7/2017.          Primary Care Physician   Morgan Valdez    Consultations This Hospital Stay   None    Discharge Orders   No discharge procedures on file.  Discharge Medications   Discharge Medication List as of 7/9/2017  3:58 PM      START taking these medications    Details   oxyCODONE (ROXICODONE) 5 MG IR tablet Take 1-2 tablets (5-10 mg) by mouth every 3 hours as needed for moderate to severe pain, Disp-30 tablet, R-0, Local Print      amoxicillin-clavulanate (AUGMENTIN) 875-125 MG per tablet Take 1 tablet by mouth 2 times daily for 7 days, Disp-14 tablet, R-0, E-Prescribe           Allergies   No Known Allergies  Data   Most Recent 3 CBC's:  Recent Labs   Lab Test  07/07/17   0532  07/05/17   1616   WBC   --   11.4*   HGB   --   17.4   MCV   --   90.8   PLT  166  235      Most Recent 3 BMP's:  Recent Labs   Lab Test  07/07/17   0532   CR  0.95     Most Recent 2 LFT's:No lab results found.  Most Recent INR's and Anticoagulation Dosing History:  Anticoagulation Dose History     There is no flowsheet data to display.        Most Recent 3 Troponin's:No lab results found.  Most Recent Cholesterol Panel:No lab results found.  Most Recent 6 Bacteria Isolates From Any Culture (See EPIC Reports for Culture  Details):No lab results found.  Most Recent TSH, T4 and A1c Labs:No lab results found.  No results found for this or any previous visit.

## 2017-07-12 NOTE — TELEPHONE ENCOUNTER
S/p Amy granda, bull.  7/7/17.  Surgeon:  Dr. Swain    Patient reports that he has some yellowish discoloration just below umbilical incision. Wondering if this is something he should be worried about?  Area is somewhat tender to touch and reports some incisional soreness with movement.  Denies fever/chills, N/V.  States no redness, swelling or drainage from incisions.      Yellowish discoloration likely old bruising  He is advised to monitor the area and call clinic if any redness, swelling, drainage, increasing pain or fever/ chills.    He has post-op appointment on 7/21/17.  Encouraged patient to call  if any further concerns or questions.

## 2017-07-21 ENCOUNTER — OFFICE VISIT (OUTPATIENT)
Dept: SURGERY | Facility: CLINIC | Age: 27
End: 2017-07-21
Payer: COMMERCIAL

## 2017-07-21 VITALS
HEIGHT: 71 IN | DIASTOLIC BLOOD PRESSURE: 78 MMHG | BODY MASS INDEX: 26.6 KG/M2 | OXYGEN SATURATION: 95 % | SYSTOLIC BLOOD PRESSURE: 122 MMHG | HEART RATE: 81 BPM | WEIGHT: 190 LBS

## 2017-07-21 DIAGNOSIS — Z09 SURGICAL FOLLOWUP VISIT: Primary | ICD-10-CM

## 2017-07-21 PROCEDURE — 99024 POSTOP FOLLOW-UP VISIT: CPT | Performed by: PHYSICIAN ASSISTANT

## 2017-07-21 NOTE — MR AVS SNAPSHOT
"              After Visit Summary   7/21/2017    Bassam Jacobsen    MRN: [id number]           Patient Information     Date Of Birth          1990        Visit Information        Provider Department      7/21/2017 11:00 AM Shavon Brown PA-C [facility] General Surgery      Today's Diagnoses     Surgical followup visit    -  1       Follow-ups after your visit        Follow-up notes from your care team     Return if symptoms worsen or fail to improve.      Who to contact     If you have questions or need follow up information about today's clinic visit or your schedule please contact [facility] directly at [phone].  Normal or non-critical lab and imaging results will be communicated to you by MyChart, letter or phone within 4 business days after the clinic has received the results. If you do not hear from us within 7 days, please contact the clinic through [employer] or phone. If you have a critical or abnormal lab result, we will notify you by phone as soon as possible.  Submit refill requests through Med[employer] or call your pharmacy and they will forward the refill request to us. Please allow 3 business days for your refill to be completed.          Additional Information About Your Visit        MyChart Information     [employer] gives you secure access to your electronic health record. If you see a primary care provider, you can also send messages to your care team and make appointments. If you have questions, please call your primary care clinic.  If you do not have a primary care provider, please call [phone] and they will assist you.        Care EveryWhere ID     This is your Care EveryWhere ID. This could be used by other organizations to access your [facility] medical records  [id number]        Your Vitals Were     Pulse Height Pulse Oximetry BMI (Body Mass Index)          81 5' 11\" (1.803 m) 95% 26.5 kg/m2         Blood " Pressure from Last 3 Encounters:   07/21/17 122/78   07/09/17 124/72   07/05/17 120/80    Weight from Last 3 Encounters:   07/21/17 190 lb (86.2 kg)   07/06/17 190 lb (86.2 kg)   07/05/17 190 lb (86.2 kg)              Today, you had the following     No orders found for display       Primary Care Provider Office Phone # Fax #    Morgan Valdez [phone]       [facility] [address]  [facility] [address]        Equal Access to Services     [facility]: Hadii aad ku hadasho Soomaali, waaxda luqadaha, qaybta kaalmada adeegyada, stefano dee . So [facility] [phone].    ATENCIÓN: Si habla español, tiene a hare disposición servicios gratuitos de asistencia lingüística. [facility] [phone].    We comply with applicable federal civil rights laws and [place] laws. We do not discriminate on the basis of race, color, national origin, age, disability sex, sexual orientation or gender identity.            Thank you!     Thank you for choosing [facility]  for your care. Our goal is always to provide you with excellent care. Hearing back from our patients is one way we can continue to improve our services. Please take a few minutes to complete the written survey that you may receive in the mail after your visit with us. Thank you!             Your Updated Medication List - Protect others around you: Learn how to safely use, store and throw away your medicines at www.disposemymeds.org.          This list is accurate as of: 7/21/17 11:36 AM.  Always use your most recent med list.                   Brand Name Dispense Instructions for use Diagnosis    * amphetamine-dextroamphetamine 30 MG per 24 hr capsule    ADDERALL XR    30 capsule    Take 1 capsule (30 mg) by mouth daily    Attention deficit hyperactivity disorder (ADHD), predominantly inattentive type       * amphetamine-dextroamphetamine 20 MG per 24 hr capsule    Start taking on:  8/5/2017    ADDERALL XR    30 capsule    Take 1 capsule (20 mg) by mouth daily    Attention deficit hyperactivity disorder (ADHD), predominantly inattentive type       * Notice:  This list has 2 medication(s) that are the same as other medications prescribed for you. Read the directions carefully, and ask your doctor or other care provider to review them with you.

## 2017-07-21 NOTE — LETTER
July 21, 2017        RE:  Bassam Jacobsen                                                                                                                                                       [address] [facility] [address]    To whom it may concern:    Bassam Jacobsen is under my professional care for recent surgery and recovery.    Please excuse from work from 7/10 thru 7/23.   OK to return to work with restrictions on 7/24.   Restrictions:  7/24-7/30: weight limit 20 lbs    7/31-8/6: weight limit 30 lbs    8/7: no longer on restrictions.    Sincerely,        Shavon Brown PA-C

## 2017-07-21 NOTE — PROGRESS NOTES
[facility] Note 7/21/2017  Subjective:  Bassam Jacobsen is here for postoperative visit.  He underwent laparoscopic appendectomy by Dr. Sawin.  Final pathology revealed acute appendicitis with necrosis and perforation.  He remained in the hospital for IV antibiotic therapy for 2 days; met criteria for discharge at that time.  He is now 2 weeks postop, and feels his recovery has been progressing nicely.  Rx/OTC pain medication was used appropriately postop.  Postop recovery complications: None.    Today he has minimal discomfort at the umbilical site with activities, tolerating a regular diet, and having normal bowel activity.  Current pain management: none.  He finished his course of antibiotics.  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  Planned RTW date: July/24.  Following restrictions outlined by surgeon.    Objective:  Abd - soft, non-tender, non-distended  Laparoscopic incisions - healing well, no erythema/bruising, +normal healing ridges/density, no seroma/hematoma noted, no hernia noted    Assessment:  S/p laparoscopic appendectomy. Pathology showed acute appendicitis with necrosis and perforation.    Plan:  Bassam may continue to slowly advance his activities at this time.  General recommendation is to remain at a 20 lb weight restriction until 3 weeks after surgery.  After that time he may increase weight restriction by 10 lbs per week.  He may continue to utilize OTC pain management options as well as use of ice/heat to sites for comfort.  He should expect progressive resolution of the healing ridge along the incisional sites over the following 2-3 months.  Work note: work note written.    Bassam is recommended to contact the office if worsening pain, onset of fever/redness at any inc site, or new drainage from the area.  Pt also recommended to call office at any time if ongoing questions/concerns during recovery, but otherwise may follow-up on a prn  basis.  Pt is in agreement with this plan.    Shavon Brown PA-C      Please route or send letter to:  Primary Care Provider (PCP)

## 2017-07-21 NOTE — LETTER
[facility] Note 2017    RE:  Bassam Jacobsen-:  [birth year]    Subjective:  Bassam Jacobsen is here for postoperative visit.  He underwent laparoscopic appendectomy by Dr. Swain.  Final pathology revealed acute appendicitis with necrosis and perforation.  He remained in the hospital for IV antibiotic therapy for 2 days; met criteria for discharge at that time.  He is now 2 weeks postop, and feels his recovery has been progressing nicely.  Rx/OTC pain medication was used appropriately postop.  Postop recovery complications: None.     Today he has minimal discomfort at the umbilical site with activities, tolerating a regular diet, and having normal bowel activity.  Current pain management: none.  He finished his course of antibiotics.  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  Planned RTW date: .  Following restrictions outlined by surgeon.     Objective:  Abd - soft, non-tender, non-distended  Laparoscopic incisions - healing well, no erythema/bruising, +normal healing ridges/density, no seroma/hematoma noted, no hernia noted     Assessment:  S/p laparoscopic appendectomy. Pathology showed acute appendicitis with necrosis and perforation.     Plan:  Bassam may continue to slowly advance his activities at this time.  General recommendation is to remain at a 20 lb weight restriction until 3 weeks after surgery.  After that time he may increase weight restriction by 10 lbs per week.  He may continue to utilize OTC pain management options as well as use of ice/heat to sites for comfort.  He should expect progressive resolution of the healing ridge along the incisional sites over the following 2-3 months.  Work note: work note written.     Bassam is recommended to contact the office if worsening pain, onset of fever/redness at any inc site, or new drainage from the area.  Pt also recommended to call office at any time if ongoing questions/concerns during  recovery, but otherwise may follow-up on a prn basis.  Pt is in agreement with this plan.     Shavon Brown PA-C

## 2017-10-18 ENCOUNTER — TELEPHONE (OUTPATIENT)
Dept: FAMILY MEDICINE | Facility: CLINIC | Age: 27
End: 2017-10-18

## 2018-10-05 ENCOUNTER — OFFICE VISIT (OUTPATIENT)
Dept: FAMILY MEDICINE | Facility: CLINIC | Age: 28
End: 2018-10-05

## 2018-10-05 VITALS
RESPIRATION RATE: 20 BRPM | HEART RATE: 60 BPM | DIASTOLIC BLOOD PRESSURE: 68 MMHG | BODY MASS INDEX: 25.65 KG/M2 | WEIGHT: 183.2 LBS | HEIGHT: 71 IN | TEMPERATURE: 98.2 F | SYSTOLIC BLOOD PRESSURE: 126 MMHG

## 2018-10-05 DIAGNOSIS — R06.83 SNORING: ICD-10-CM

## 2018-10-05 DIAGNOSIS — Z00.00 ROUTINE GENERAL MEDICAL EXAMINATION AT A HEALTH CARE FACILITY: Primary | ICD-10-CM

## 2018-10-05 DIAGNOSIS — F90.2 ATTENTION DEFICIT HYPERACTIVITY DISORDER (ADHD), COMBINED TYPE: ICD-10-CM

## 2018-10-05 PROCEDURE — 36415 COLL VENOUS BLD VENIPUNCTURE: CPT | Performed by: FAMILY MEDICINE

## 2018-10-05 PROCEDURE — 90686 IIV4 VACC NO PRSV 0.5 ML IM: CPT | Performed by: FAMILY MEDICINE

## 2018-10-05 PROCEDURE — 90471 IMMUNIZATION ADMIN: CPT | Performed by: FAMILY MEDICINE

## 2018-10-05 PROCEDURE — 90714 TD VACC NO PRESV 7 YRS+ IM: CPT | Performed by: FAMILY MEDICINE

## 2018-10-05 PROCEDURE — 80061 LIPID PANEL: CPT | Mod: 90 | Performed by: FAMILY MEDICINE

## 2018-10-05 PROCEDURE — 80053 COMPREHEN METABOLIC PANEL: CPT | Mod: 90 | Performed by: FAMILY MEDICINE

## 2018-10-05 PROCEDURE — 99395 PREV VISIT EST AGE 18-39: CPT | Mod: 25 | Performed by: FAMILY MEDICINE

## 2018-10-05 PROCEDURE — 90472 IMMUNIZATION ADMIN EACH ADD: CPT | Performed by: FAMILY MEDICINE

## 2018-10-05 RX ORDER — DEXTROAMPHETAMINE SACCHARATE, AMPHETAMINE ASPARTATE MONOHYDRATE, DEXTROAMPHETAMINE SULFATE AND AMPHETAMINE SULFATE 6.25; 6.25; 6.25; 6.25 MG/1; MG/1; MG/1; MG/1
25 CAPSULE, EXTENDED RELEASE ORAL DAILY
Qty: 30 CAPSULE | Refills: 0 | Status: SHIPPED | OUTPATIENT
Start: 2018-10-05 | End: 2018-11-04

## 2018-10-05 RX ORDER — DEXTROAMPHETAMINE SACCHARATE, AMPHETAMINE ASPARTATE MONOHYDRATE, DEXTROAMPHETAMINE SULFATE AND AMPHETAMINE SULFATE 6.25; 6.25; 6.25; 6.25 MG/1; MG/1; MG/1; MG/1
25 CAPSULE, EXTENDED RELEASE ORAL DAILY
Qty: 30 CAPSULE | Refills: 0 | Status: SHIPPED | OUTPATIENT
Start: 2018-11-05 | End: 2018-12-05

## 2018-10-05 RX ORDER — DEXTROAMPHETAMINE SACCHARATE, AMPHETAMINE ASPARTATE MONOHYDRATE, DEXTROAMPHETAMINE SULFATE AND AMPHETAMINE SULFATE 6.25; 6.25; 6.25; 6.25 MG/1; MG/1; MG/1; MG/1
25 CAPSULE, EXTENDED RELEASE ORAL DAILY
Qty: 30 CAPSULE | Refills: 0 | Status: SHIPPED | OUTPATIENT
Start: 2018-12-06 | End: 2019-01-05

## 2018-10-05 NOTE — MR AVS SNAPSHOT
After Visit Summary   10/5/2018    Bassam Jacobsen    MRN: [id number]           Patient Information     Date Of Birth          1990        Visit Information        Provider Department      10/5/2018 8:30 AM José Miguel, Morgan Christopher, MD [facility]        Today's Diagnoses     Routine general medical examination at a health care facility    -  1    Attention deficit hyperactivity disorder (ADHD), combined type        Snoring           Follow-ups after your visit        Additional Services     PULMONARY MEDICINE REFERRAL       Your provider has referred you to: N: [facility] [phone]   [email or web address]    Please be aware that coverage of these services is subject to the terms and limitations of your health insurance plan.  Call member services at your health plan with any benefit or coverage questions.      Please bring the following with you to your appointment:    (1) Any X-Rays, CTs or MRIs which have been performed.  Contact the facility where they were done to arrange for  prior to your scheduled appointment.    (2) List of current medications   (3) This referral request   (4) Any documents/labs given to you for this referral                  Follow-up notes from your care team     Return in about 6 months (around 4/5/2019).      Who to contact     If you have questions or need follow up information about today's clinic visit or your schedule please contact [facility] directly at [phone].  Normal or non-critical lab and imaging results will be communicated to you by MyChart, letter or phone within 4 business days after the clinic has received the results. If you do not hear from us within 7 days, please contact the clinic through MyChart or phone. If you have a critical or abnormal lab result, we will notify you by phone as soon as possible.  Submit refill requests through [employer] or call your pharmacy  "and they will forward the refill request to us. Please allow 3 business days for your refill to be completed.          Additional Information About Your Visit        Guang Lian Shi DaiharErnie's Information     [employer] gives you secure access to your electronic health record. If you see a primary care provider, you can also send messages to your care team and make appointments. If you have questions, please call your primary care clinic.  If you do not have a primary care provider, please call [phone] and they will assist you.        Care EveryWhere ID     This is your Care EveryWhere ID. This could be used by other organizations to access your [facility] medical records  [id number]        Your Vitals Were     Pulse Temperature Respirations Height BMI (Body Mass Index)       60 98.2  F (36.8  C) (Oral) 20 1.797 m (5' 10.75\") 25.73 kg/m2        Blood Pressure from Last 3 Encounters:   10/05/18 126/68   07/21/17 122/78   07/09/17 124/72    Weight from Last 3 Encounters:   10/05/18 83.1 kg (183 lb 3.2 oz)   07/21/17 86.2 kg (190 lb)   07/06/17 86.2 kg (190 lb)              We Performed the Following     COMPREHENSIVE METABOLIC PANEL ([facility]) XCMP     HC FLU VAC PRESRV FREE QUAD SPLIT VIR 3+YRS IM     Lipid Profile ([facility])     PULMONARY MEDICINE REFERRAL     TD (ADULT, 7+) PRESERVE FREE     VACCINE ADMINISTRATION, EACH ADDITIONAL     VACCINE ADMINISTRATION, INITIAL     VENOUS COLLECTION          Today's Medication Changes          These changes are accurate as of 10/5/18  9:55 AM.  If you have any questions, ask your nurse or doctor.               Start taking these medicines.        Dose/Directions    * amphetamine-dextroamphetamine 25 MG 24 hr capsule   Commonly known as:  ADDERALL XR   Used for:  Attention deficit hyperactivity disorder (ADHD), combined type   Started by:  Morgan Valdez MD        Dose:  25 mg   Take 1 capsule (25 mg) by mouth daily   Quantity:  30 capsule   Refills:  0       * " amphetamine-dextroamphetamine 25 MG 24 hr capsule   Commonly known as:  ADDERALL XR   Used for:  Attention deficit hyperactivity disorder (ADHD), combined type   Started by:  Morgan Valdez MD        Dose:  25 mg   Start taking on:  11/5/2018   Take 1 capsule (25 mg) by mouth daily   Quantity:  30 capsule   Refills:  0       * amphetamine-dextroamphetamine 25 MG 24 hr capsule   Commonly known as:  ADDERALL XR   Used for:  Attention deficit hyperactivity disorder (ADHD), combined type   Started by:  Morgan Valdez MD        Dose:  25 mg   Start taking on:  12/6/2018   Take 1 capsule (25 mg) by mouth daily   Quantity:  30 capsule   Refills:  0       * Notice:  This list has 3 medication(s) that are the same as other medications prescribed for you. Read the directions carefully, and ask your doctor or other care provider to review them with you.         Where to get your medicines      Some of these will need a paper prescription and others can be bought over the counter.  Ask your nurse if you have questions.     Bring a paper prescription for each of these medications     amphetamine-dextroamphetamine 25 MG 24 hr capsule    amphetamine-dextroamphetamine 25 MG 24 hr capsule    amphetamine-dextroamphetamine 25 MG 24 hr capsule                Primary Care Provider Office Phone # Fax #    Morgan Valdez [phone]       [address] [address]        Equal Access to Services     [facility]: Hadii yonis lr hadasho Soomaali, waaxda luqadaha, qaybta kaalmada adeegyada, stefano price haysantosh dee . So [facility] [phone].    ATENCIÓN: Si habla español, tiene a hare disposición servicios gratuitos de asistencia lingüística. Llame al [phone].    We comply with applicable federal civil rights laws and [place] laws. We do not discriminate on the basis of race, color, national origin, age, disability, sex, sexual orientation, or gender  identity.            Thank you!     Thank you for choosing [facility]  for your care. Our goal is always to provide you with excellent care. Hearing back from our patients is one way we can continue to improve our services. Please take a few minutes to complete the written survey that you may receive in the mail after your visit with us. Thank you!             Your Updated Medication List - Protect others around you: Learn how to safely use, store and throw away your medicines at www.disposemymeds.org.          This list is accurate as of 10/5/18  9:55 AM.  Always use your most recent med list.                   Brand Name Dispense Instructions for use Diagnosis    * amphetamine-dextroamphetamine 25 MG 24 hr capsule    ADDERALL XR    30 capsule    Take 1 capsule (25 mg) by mouth daily    Attention deficit hyperactivity disorder (ADHD), combined type       * amphetamine-dextroamphetamine 25 MG 24 hr capsule   Start taking on:  11/5/2018    ADDERALL XR    30 capsule    Take 1 capsule (25 mg) by mouth daily    Attention deficit hyperactivity disorder (ADHD), combined type       * amphetamine-dextroamphetamine 25 MG 24 hr capsule   Start taking on:  12/6/2018    ADDERALL XR    30 capsule    Take 1 capsule (25 mg) by mouth daily    Attention deficit hyperactivity disorder (ADHD), combined type       * Notice:  This list has 3 medication(s) that are the same as other medications prescribed for you. Read the directions carefully, and ask your doctor or other care provider to review them with you.

## 2018-10-05 NOTE — NURSING NOTE
Bassam Jacobsen is here for a CPX.    Pre-visit planning  Immunizations -up to date - Td, flu  Colonoscopy -  Mammogram -  Asthma test --  PHQ9 -  GRAHAM 7 -    Questioned patient about current smoking habits.  Pt was a former light smoker  Body mass index is 25.73 kg/(m^2).  PULSE regular  My Chart: actuive  CLASSIFICATION OF OVERWEIGHT AND OBESITY BY BMI                        Obesity Class           BMI(kg/m2)  Underweight                                    < 18.5  Normal                                         18.5-24.9  Overweight                                     25.0-29.9  OBESITY                     I                  30.0-34.9                             II                 35.0-39.9  EXTREME OBESITY             III                >40                            Patient's  BMI Body mass index is 25.73 kg/(m^2).  Http://hin.nhlbi.nih.gov/menuplanner/menu.cgi

## 2018-10-05 NOTE — PROGRESS NOTES
SUBJECTIVE:   CC: Bassam Jacobsen is an 28 year old male who presents for preventative health visit.     Healthy Habits:    Do you get at least three servings of calcium containing foods daily (dairy, green leafy vegetables, etc.)? yes    Amount of exercise or daily activities, outside of work: minimal day(s) per week-work active as he moves [employer]    Problems taking medications regularly No    Medication side effects: No    Have you had an eye exam in the past two years? yes    Do you see a dentist twice per year? yes    Do you have sleep apnea, excessive snoring or daytime drowsiness?pt worried he has KHAI       ADHD- has been off meds since January-last rx through Dr Yusuf-notes reviewed from visit 10/17-Utox neg, rx for 3 months but now insurance changed and he can come here again        Today's PHQ-2 Score:   PHQ-2 ( 1999 [employer]) 10/5/2018   Q1: Little interest or pleasure in doing things 0   Q2: Feeling down, depressed or hopeless 0   PHQ-2 Score 0       Abuse: Current or Past(Physical, Sexual or Emotional)- No  Do you feel safe in your environment - Yes    Social History   Substance Use Topics     Smoking status: Former Smoker     Smokeless tobacco: Never Used      Comment: Very light smoker     Alcohol use 1.0 oz/week     2 Standard drinks or equivalent per week      If you drink alcohol do you typically have >3 drinks per day or >7 drinks per week? No                      Last PSA: No results found for: PSA    Reviewed orders with patient. Reviewed health maintenance and updated orders accordingly - Yes  BP Readings from Last 3 Encounters:   10/05/18 126/68   07/21/17 122/78   07/09/17 124/72    Wt Readings from Last 3 Encounters:   10/05/18 83.1 kg (183 lb 3.2 oz)   07/21/17 86.2 kg (190 lb)   07/06/17 86.2 kg (190 lb)                  Patient Active Problem List   Diagnosis     Attention deficit hyperactivity disorder (ADHD), combined type     Baby premature 26-28 weeks     Health Care Home     ACP  (advance care planning)     Acute perforated appendicitis     Past Surgical History:   Procedure Laterality Date     ENT SURGERY       HEAD & NECK SURGERY       HERNIA REPAIR       LAPAROSCOPIC APPENDECTOMY N/A 2017    Procedure: LAPAROSCOPIC APPENDECTOMY;  LAPAROSCOPIC APPENDECTOMY;  Surgeon: Christiano Swain MD;  Location: [facility] OR      REPAIR PATENT DUCT ARTERIOSUS         Social History   Substance Use Topics     Smoking status: Former Smoker     Smokeless tobacco: Never Used      Comment: Very light smoker     Alcohol use 1.0 oz/week     2 Standard drinks or equivalent per week     Family History   Problem Relation Age of Onset     C.A.D. No family hx of      Diabetes No family hx of      Cerebrovascular Disease No family hx of      Coronary Artery Disease No family hx of      Colon Cancer No family hx of      Prostate Cancer No family hx of          Current Outpatient Prescriptions   Medication Sig Dispense Refill     amphetamine-dextroamphetamine (ADDERALL XR) 25 MG 24 hr capsule Take 1 capsule (25 mg) by mouth daily 30 capsule 0     [START ON 2018] amphetamine-dextroamphetamine (ADDERALL XR) 25 MG 24 hr capsule Take 1 capsule (25 mg) by mouth daily 30 capsule 0     [START ON 2018] amphetamine-dextroamphetamine (ADDERALL XR) 25 MG 24 hr capsule Take 1 capsule (25 mg) by mouth daily 30 capsule 0     No Known Allergies  Recent Labs   Lab Test  17   0532   CR  0.95   GFRESTIMATED  >90  Non  GFR Calc     GFRESTBLACK  >90   GFR Calc          Reviewed and updated as needed this visit by clinical staff  Tobacco  Allergies  Meds  Problems         Reviewed and updated as needed this visit by Provider        No past medical history on file.   Past Surgical History:   Procedure Laterality Date     ENT SURGERY       HEAD & NECK SURGERY       HERNIA REPAIR       LAPAROSCOPIC APPENDECTOMY N/A 2017    Procedure: LAPAROSCOPIC APPENDECTOMY;  LAPAROSCOPIC  "APPENDECTOMY;  Surgeon: Christiano Swain MD;  Location: [facility]      REPAIR PATENT DUCT ARTERIOSUS         ROS:  CONSTITUTIONAL: NEGATIVE for fever, chills, change in weight  INTEGUMENTARY/SKIN: NEGATIVE for worrisome rashes, moles or lesions  EYES: NEGATIVE for vision changes or irritation  ENT: NEGATIVE for ear, mouth and throat problems  RESP: NEGATIVE for significant cough or SOB  CV: NEGATIVE for chest pain, palpitations or peripheral edema  GI: NEGATIVE for nausea, abdominal pain, heartburn, or change in bowel habits   male: negative for dysuria, hematuria, decreased urinary stream, erectile dysfunction, urethral discharge  MUSCULOSKELETAL: NEGATIVE for significant arthralgias or myalgia  NEURO: NEGATIVE for weakness, dizziness or paresthesias  ENDOCRINE: NEGATIVE for temperature intolerance, skin/hair changes  PSYCHIATRIC: NEGATIVE for changes in mood or affect    OBJECTIVE:   /68 (BP Location: Right arm, Patient Position: Chair, Cuff Size: Adult Regular)  Pulse 60  Temp 98.2  F (36.8  C) (Oral)  Resp 20  Ht 1.797 m (5' 10.75\")  Wt 83.1 kg (183 lb 3.2 oz)  BMI 25.73 kg/m2  EXAM:  GENERAL: healthy, alert and no distress  EYES: Eyes grossly normal to inspection, PERRL and conjunctivae and sclerae normal  HENT: ear canals and TM's normal, nose and mouth without ulcers or lesions  NECK: no adenopathy, no asymmetry, masses, or scars and thyroid normal to palpation  RESP: lungs clear to auscultation - no rales, rhonchi or wheezes  CV: regular rate and rhythm, normal S1 S2, no S3 or S4, no murmur, click or rub, no peripheral edema and peripheral pulses strong  ABDOMEN: soft, nontender, no hepatosplenomegaly, no masses and bowel sounds normal   (male): normal male genitalia without lesions or urethral discharge, no hernia  RECTAL (male): deferred  MS: no gross musculoskeletal defects noted, no edema  SKIN: no suspicious lesions or rashes  NEURO: Normal strength and tone, mentation intact and " "speech normal  PSYCH: mentation appears normal, affect normal/bright        ASSESSMENT/PLAN:   (Z00.00) Routine general medical examination at a health care facility  (primary encounter diagnosis)  Comment: discussed preventitive healthcare   Plan: HC FLU VAC PRESRV FREE QUAD SPLIT VIR 3+YRS IM,        VACCINE ADMINISTRATION, INITIAL, TD (ADULT, 7+)        PRESERVE FREE, VACCINE ADMINISTRATION, EACH         ADDITIONAL, Lipid Profile ([facility]),         COMPREHENSIVE METABOLIC PANEL ([facility]) XCMP,         VENOUS COLLECTION        Continue to work on healthy diet and exercise, discussed healthy habits     (F90.2) Attention deficit hyperactivity disorder (ADHD), combined type  Comment: poorly controlled-has been opff meds most of this year-reviewed last rx and visit with Dr Pascual-wants to come here again and restart  Plan: amphetamine-dextroamphetamine (ADDERALL XR) 25         MG 24 hr capsule, amphetamine-dextroamphetamine        (ADDERALL XR) 25 MG 24 hr capsule,         amphetamine-dextroamphetamine (ADDERALL XR) 25         MG 24 hr capsule        I reviewed the risks, benefits, and possible side effects of the medication.  The patient had an opportunity to ask any questions regarding the treatment plan. The patient was encouraged to call my office if any problems.     Recheck 6 mo    (R06.83) Snoring  Comment: pt concerned about sleep apnea  Plan: PULMONARY MEDICINE REFERRAL              COUNSELING:  Reviewed preventive health counseling, as reflected in patient instructions       Regular exercise       Healthy diet/nutrition       Vision screening       Immunizations    Vaccinated for: Influenza and TDAP          BP Readings from Last 1 Encounters:   10/05/18 126/68     Estimated body mass index is 25.73 kg/(m^2) as calculated from the following:    Height as of this encounter: 1.797 m (5' 10.75\").    Weight as of this encounter: 83.1 kg (183 lb 3.2 oz).           reports that he has quit smoking. He has never used " smokeless tobacco.      Counseling Resources:  ATP IV Guidelines  Pooled Cohorts Equation Calculator  FRAX Risk Assessment  ICSI Preventive Guidelines  Dietary Guidelines for Americans, 2010  USDA's MyPlate  ASA Prophylaxis  Lung CA Screening    Morgan Valdez MD  [facility]

## 2018-10-06 LAB
ALBUMIN SERPL-MCNC: 4.7 G/DL (ref 3.6–5.1)
ALBUMIN/GLOB SERPL: 1.9 (CALC) (ref 1–2.5)
ALP SERPL-CCNC: 61 U/L (ref 40–115)
ALT SERPL-CCNC: 16 U/L (ref 9–46)
AST SERPL-CCNC: 21 U/L (ref 10–40)
BILIRUB SERPL-MCNC: 1.6 MG/DL (ref 0.2–1.2)
BUN SERPL-MCNC: 20 MG/DL (ref 7–25)
BUN/CREATININE RATIO: ABNORMAL (CALC) (ref 6–22)
CALCIUM SERPL-MCNC: 9.6 MG/DL (ref 8.6–10.3)
CHLORIDE SERPLBLD-SCNC: 103 MMOL/L (ref 98–110)
CHOLEST SERPL-MCNC: 189 MG/DL
CHOLEST/HDLC SERPL: 3 (CALC)
CO2 SERPL-SCNC: 28 MMOL/L (ref 20–32)
CREAT SERPL-MCNC: 0.84 MG/DL (ref 0.6–1.35)
EGFR AFRICAN AMERICAN - QUEST: 138 ML/MIN/1.73M2
GFR SERPL CREATININE-BSD FRML MDRD: 119 ML/MIN/1.73M2
GLOBULIN, CALCULATED - QUEST: 2.5 G/DL (CALC) (ref 1.9–3.7)
GLUCOSE - QUEST: 79 MG/DL (ref 65–99)
HDLC SERPL-MCNC: 64 MG/DL
LDLC SERPL CALC-MCNC: 111 MG/DL (CALC)
NONHDLC SERPL-MCNC: 125 MG/DL (CALC)
POTASSIUM SERPL-SCNC: 3.9 MMOL/L (ref 3.5–5.3)
PROT SERPL-MCNC: 7.2 G/DL (ref 6.1–8.1)
SODIUM SERPL-SCNC: 139 MMOL/L (ref 135–146)
TRIGL SERPL-MCNC: 48 MG/DL

## 2019-03-29 DIAGNOSIS — F90.2 ATTENTION DEFICIT HYPERACTIVITY DISORDER (ADHD), COMBINED TYPE: Primary | ICD-10-CM

## 2019-03-29 NOTE — TELEPHONE ENCOUNTER
Bassam called to refill his Adderall. He got 3 months Oct 5th and never  another 3 months in January and requesting that now.     Which ever clinical assistant informs him when ready, please CALL, not via [employer].       Pending Prescriptions:                       Disp   Refills    amphetamine-dextroamphetamine (ADDERALL X*30 cap*0            Sig: Take 1 capsule (25 mg) by mouth daily    amphetamine-dextroamphetamine (ADDERALL X*30 cap*0            Sig: Take 1 capsule (25 mg) by mouth daily    amphetamine-dextroamphetamine (ADDERALL X*30 cap*0            Sig: Take 1 capsule (25 mg) by mouth daily

## 2019-04-01 RX ORDER — DEXTROAMPHETAMINE SACCHARATE, AMPHETAMINE ASPARTATE MONOHYDRATE, DEXTROAMPHETAMINE SULFATE AND AMPHETAMINE SULFATE 6.25; 6.25; 6.25; 6.25 MG/1; MG/1; MG/1; MG/1
25 CAPSULE, EXTENDED RELEASE ORAL DAILY
Qty: 30 CAPSULE | Refills: 0 | Status: SHIPPED | OUTPATIENT
Start: 2019-04-01 | End: 2019-05-01

## 2019-04-01 RX ORDER — DEXTROAMPHETAMINE SACCHARATE, AMPHETAMINE ASPARTATE MONOHYDRATE, DEXTROAMPHETAMINE SULFATE AND AMPHETAMINE SULFATE 6.25; 6.25; 6.25; 6.25 MG/1; MG/1; MG/1; MG/1
25 CAPSULE, EXTENDED RELEASE ORAL DAILY
Qty: 30 CAPSULE | Refills: 0 | Status: SHIPPED | OUTPATIENT
Start: 2019-05-30 | End: 2019-06-29

## 2019-04-01 RX ORDER — DEXTROAMPHETAMINE SACCHARATE, AMPHETAMINE ASPARTATE MONOHYDRATE, DEXTROAMPHETAMINE SULFATE AND AMPHETAMINE SULFATE 6.25; 6.25; 6.25; 6.25 MG/1; MG/1; MG/1; MG/1
25 CAPSULE, EXTENDED RELEASE ORAL DAILY
Qty: 30 CAPSULE | Refills: 0 | Status: SHIPPED | OUTPATIENT
Start: 2019-04-29 | End: 2019-05-29

## 2019-04-01 NOTE — TELEPHONE ENCOUNTER
Pt came into the office to see if his scripts were ready. I told him [facility] was out all last week and will get to them soon. Pt ask that we call him at [phone] when done.

## 2019-07-26 DIAGNOSIS — F90.2 ATTENTION DEFICIT HYPERACTIVITY DISORDER (ADHD), COMBINED TYPE: Primary | ICD-10-CM

## 2019-07-26 NOTE — TELEPHONE ENCOUNTER
Patient last seen 3/2019 due for follow up in September    Pending Prescriptions:                       Disp   Refills    amphetamine-dextroamphetamine (ADDERALL X*30 cap*0            Sig: Take 1 capsule (25 mg) by mouth daily    amphetamine-dextroamphetamine (ADDERALL X*30 cap*0            Sig: Take 1 capsule (25 mg) by mouth daily    amphetamine-dextroamphetamine (ADDERALL X*30 cap*0            Sig: Take 1 capsule (25 mg) by mouth daily

## 2019-07-29 RX ORDER — DEXTROAMPHETAMINE SACCHARATE, AMPHETAMINE ASPARTATE MONOHYDRATE, DEXTROAMPHETAMINE SULFATE AND AMPHETAMINE SULFATE 6.25; 6.25; 6.25; 6.25 MG/1; MG/1; MG/1; MG/1
25 CAPSULE, EXTENDED RELEASE ORAL DAILY
Qty: 30 CAPSULE | Refills: 0 | Status: SHIPPED | OUTPATIENT
Start: 2019-08-26 | End: 2019-09-25

## 2019-07-29 RX ORDER — DEXTROAMPHETAMINE SACCHARATE, AMPHETAMINE ASPARTATE MONOHYDRATE, DEXTROAMPHETAMINE SULFATE AND AMPHETAMINE SULFATE 6.25; 6.25; 6.25; 6.25 MG/1; MG/1; MG/1; MG/1
25 CAPSULE, EXTENDED RELEASE ORAL DAILY
Qty: 30 CAPSULE | Refills: 0 | Status: SHIPPED | OUTPATIENT
Start: 2019-09-26 | End: 2020-04-20

## 2019-07-29 RX ORDER — DEXTROAMPHETAMINE SACCHARATE, AMPHETAMINE ASPARTATE MONOHYDRATE, DEXTROAMPHETAMINE SULFATE AND AMPHETAMINE SULFATE 6.25; 6.25; 6.25; 6.25 MG/1; MG/1; MG/1; MG/1
25 CAPSULE, EXTENDED RELEASE ORAL DAILY
Qty: 30 CAPSULE | Refills: 0 | Status: SHIPPED | OUTPATIENT
Start: 2019-07-29 | End: 2019-08-28

## 2019-08-29 ENCOUNTER — TELEPHONE (OUTPATIENT)
Dept: FAMILY MEDICINE | Facility: CLINIC | Age: 29
End: 2019-08-29

## 2019-08-29 DIAGNOSIS — F90.2 ATTENTION DEFICIT HYPERACTIVITY DISORDER (ADHD), COMBINED TYPE: ICD-10-CM

## 2019-08-29 NOTE — TELEPHONE ENCOUNTER
Bassam called asking for a new Adderall rx however he rec'd it in July for 3 months - awaiting a return call to find out why he needs them already?    Patient's phone #[phone]

## 2019-08-29 NOTE — TELEPHONE ENCOUNTER
Bassam called back stating that he did not have insurance in July and never filled those scripts. He wants to bring in the old scripts and receive 3 new ones because he now has insurance. I told him that Dr. Valdez is back on Tuesday to review this and he was ok with that. He stated he wont be able to  any scripts until Saturday 9/7.

## 2019-09-03 RX ORDER — DEXTROAMPHETAMINE SACCHARATE, AMPHETAMINE ASPARTATE MONOHYDRATE, DEXTROAMPHETAMINE SULFATE AND AMPHETAMINE SULFATE 6.25; 6.25; 6.25; 6.25 MG/1; MG/1; MG/1; MG/1
25 CAPSULE, EXTENDED RELEASE ORAL DAILY
Qty: 30 CAPSULE | Refills: 0 | Status: SHIPPED | OUTPATIENT
Start: 2019-11-04 | End: 2020-04-20

## 2019-09-03 RX ORDER — DEXTROAMPHETAMINE SACCHARATE, AMPHETAMINE ASPARTATE MONOHYDRATE, DEXTROAMPHETAMINE SULFATE AND AMPHETAMINE SULFATE 6.25; 6.25; 6.25; 6.25 MG/1; MG/1; MG/1; MG/1
25 CAPSULE, EXTENDED RELEASE ORAL DAILY
Qty: 30 CAPSULE | Refills: 0 | Status: SHIPPED | OUTPATIENT
Start: 2019-09-03 | End: 2020-03-18

## 2019-09-03 RX ORDER — DEXTROAMPHETAMINE SACCHARATE, AMPHETAMINE ASPARTATE MONOHYDRATE, DEXTROAMPHETAMINE SULFATE AND AMPHETAMINE SULFATE 6.25; 6.25; 6.25; 6.25 MG/1; MG/1; MG/1; MG/1
25 CAPSULE, EXTENDED RELEASE ORAL DAILY
Qty: 30 CAPSULE | Refills: 0 | Status: SHIPPED | OUTPATIENT
Start: 2019-10-04 | End: 2020-04-20

## 2019-12-15 ENCOUNTER — HEALTH MAINTENANCE LETTER (OUTPATIENT)
Age: 29
End: 2019-12-15

## 2020-03-18 DIAGNOSIS — F90.2 ATTENTION DEFICIT HYPERACTIVITY DISORDER (ADHD), COMBINED TYPE: ICD-10-CM

## 2020-03-18 RX ORDER — DEXTROAMPHETAMINE SACCHARATE, AMPHETAMINE ASPARTATE MONOHYDRATE, DEXTROAMPHETAMINE SULFATE AND AMPHETAMINE SULFATE 6.25; 6.25; 6.25; 6.25 MG/1; MG/1; MG/1; MG/1
25 CAPSULE, EXTENDED RELEASE ORAL DAILY
Qty: 30 CAPSULE | Refills: 0 | Status: SHIPPED | OUTPATIENT
Start: 2020-03-18 | End: 2020-07-28 | Stop reason: ALTCHOICE

## 2020-03-18 NOTE — TELEPHONE ENCOUNTER
Bassam was last seen 10/05/2018.  He did not have insurance until now.  He is requesting a refill of his Adderall.    Pending Prescriptions:                       Disp   Refills    amphetamine-dextroamphetamine (ADDERALL X*30 cap*0            Sig: Take 1 capsule (25 mg) by mouth daily    Patient's phone #[phone]

## 2020-04-20 ENCOUNTER — OFFICE VISIT (OUTPATIENT)
Dept: FAMILY MEDICINE | Facility: CLINIC | Age: 30
End: 2020-04-20

## 2020-04-20 VITALS
BODY MASS INDEX: 26.21 KG/M2 | SYSTOLIC BLOOD PRESSURE: 122 MMHG | TEMPERATURE: 97.6 F | HEIGHT: 71 IN | WEIGHT: 187.2 LBS | DIASTOLIC BLOOD PRESSURE: 70 MMHG | HEART RATE: 76 BPM | RESPIRATION RATE: 20 BRPM

## 2020-04-20 DIAGNOSIS — F90.2 ATTENTION DEFICIT HYPERACTIVITY DISORDER (ADHD), COMBINED TYPE: Primary | ICD-10-CM

## 2020-04-20 PROCEDURE — 99213 OFFICE O/P EST LOW 20 MIN: CPT | Performed by: FAMILY MEDICINE

## 2020-04-20 RX ORDER — DEXTROAMPHETAMINE SACCHARATE, AMPHETAMINE ASPARTATE MONOHYDRATE, DEXTROAMPHETAMINE SULFATE AND AMPHETAMINE SULFATE 7.5; 7.5; 7.5; 7.5 MG/1; MG/1; MG/1; MG/1
30 CAPSULE, EXTENDED RELEASE ORAL DAILY
Qty: 30 CAPSULE | Refills: 0 | Status: SHIPPED | OUTPATIENT
Start: 2020-04-20 | End: 2020-07-28

## 2020-04-20 RX ORDER — DEXTROAMPHETAMINE SACCHARATE, AMPHETAMINE ASPARTATE MONOHYDRATE, DEXTROAMPHETAMINE SULFATE AND AMPHETAMINE SULFATE 7.5; 7.5; 7.5; 7.5 MG/1; MG/1; MG/1; MG/1
30 CAPSULE, EXTENDED RELEASE ORAL DAILY
Qty: 30 CAPSULE | Refills: 0 | Status: SHIPPED | OUTPATIENT
Start: 2020-06-21 | End: 2020-07-21

## 2020-04-20 RX ORDER — DEXTROAMPHETAMINE SACCHARATE, AMPHETAMINE ASPARTATE MONOHYDRATE, DEXTROAMPHETAMINE SULFATE AND AMPHETAMINE SULFATE 7.5; 7.5; 7.5; 7.5 MG/1; MG/1; MG/1; MG/1
30 CAPSULE, EXTENDED RELEASE ORAL DAILY
Qty: 30 CAPSULE | Refills: 0 | Status: SHIPPED | OUTPATIENT
Start: 2020-05-21 | End: 2020-07-28

## 2020-04-20 ASSESSMENT — MIFFLIN-ST. JEOR: SCORE: 1832.29

## 2020-04-20 NOTE — PROGRESS NOTES
"ADHD Follow Up   SUBJECTIVE:     Are your symptoms controlled? Pretty well, still workig fulltime despite covid 19  Are you satisfied with your current medication dosage? Overall Yes, 25 mg XR works but wonders if 30 mg would be better   Are you taking your medication regularly? yes  Are you experiencing any insomnia? no  Are you experiencing any jitteriness? no  Are you experiencing any loss of appetite or weight loss? no  Are you experiencing any other side effects? no  ROS:   CONSTITUTIONAL:NEGATIVE   RESP: NEGATIVE   CV: NEGATIVE   NEURO: NEGATIVE  OBJECTIVE:  /70 (BP Location: Left arm, Patient Position: Chair, Cuff Size: Adult Regular)   Pulse 76   Temp 97.6  F (36.4  C) (Oral)   Resp 20   Ht 1.797 m (5' 10.75\")   Wt 84.9 kg (187 lb 3.2 oz)   BMI 26.29 kg/m    S1 and S2 normal, no murmurs, clicks, gallops or rubs. Regular rate and rhythm. Chest is clear; no wheezes or rales. No edema or JVD.     ASSESSMENT:  Per encounter diagnoses.    (F90.2) Attention deficit hyperactivity disorder (ADHD), combined type  (primary encounter diagnosis)  Comment: discussed options- we agree to increase dose to 30 mg  Plan: I reviewed the risks, benefits, and possible side effects of the medication.  The patient had an opportunity to ask any questions regarding the treatment plan. The patient was encouraged to call my office if any problems.     Recheck 6 mo     "

## 2020-06-11 DIAGNOSIS — N46.9 MALE SUBFERTILITY: Primary | ICD-10-CM

## 2020-06-18 DIAGNOSIS — N46.9 MALE SUBFERTILITY: ICD-10-CM

## 2020-06-18 PROCEDURE — 89322 SEMEN ANAL STRICT CRITERIA: CPT

## 2020-06-19 LAB
ABNORMAL SPERM: 90 MORPHOLOGY
ABSTINENCE DAYS: 5 DAYS (ref 2–7)
AGGLUTINATION: NO YES/NO
ANALYSIS TEMP - CENTIGRADE: 24 CENTIGRADE
CELL FRAGMENTS: NORMAL %
COLLECTION METHOD: NORMAL
COLLECTION SITE: NORMAL
CONSENT TO RELEASE TO PARTNER: YES
HEAD DEFECT: 90
IMMATURE SPERM: NORMAL %
IMMOTILE: 34 %
LAB RECEIPT TIME: NORMAL
LIQUEFIED: YES YES/NO
MIDPIECE DEFECT: 38
NON-PROGRESSIVE MOTILITY: 3 %
NORMAL SPERM: 10 % NORMAL FORMS (ref 4–?)
PROGRESSIVE MOTILITY: 63 % (ref 32–?)
ROUND CELLS: 0.2 MILLION/ML (ref ?–2)
SPECIMEN CONCENTRATION: 91 MILLION/ML (ref 15–?)
SPECIMEN PH: 7.6 PH (ref 7.2–?)
SPECIMEN TYPE: NORMAL
SPECIMEN VOL UR: 4 ML (ref 1.5–?)
TAIL DEFECT: 3
TIME OF ANALYSIS: NORMAL
TOTAL NUMBER: 364 MILLION (ref 39–?)
TOTAL PROGRESSIVE MOTILE: 229 MILLION (ref 15.6–?)
VISCOUS: NO YES/NO
VITALITY: NORMAL % (ref 58–?)
WBC SPECIMEN: NORMAL %

## 2020-07-28 DIAGNOSIS — F90.2 ATTENTION DEFICIT HYPERACTIVITY DISORDER (ADHD), COMBINED TYPE: Primary | ICD-10-CM

## 2020-07-28 NOTE — TELEPHONE ENCOUNTER
Pt called for his next 3 months of Adderall. Last seen 04/20/20.    Bassam Jacobsen is requesting a refill of:    Pending Prescriptions:                       Disp   Refills    amphetamine-dextroamphetamine (ADDERALL X*30 cap*0            Sig: Take 1 capsule (30 mg) by mouth daily    amphetamine-dextroamphetamine (ADDERALL X*30 cap*0            Sig: Take 1 capsule (30 mg) by mouth daily    amphetamine-dextroamphetamine (ADDERALL X*30 cap*0            Sig: Take 1 capsule (30 mg) by mouth daily

## 2020-07-29 RX ORDER — DEXTROAMPHETAMINE SACCHARATE, AMPHETAMINE ASPARTATE MONOHYDRATE, DEXTROAMPHETAMINE SULFATE AND AMPHETAMINE SULFATE 7.5; 7.5; 7.5; 7.5 MG/1; MG/1; MG/1; MG/1
30 CAPSULE, EXTENDED RELEASE ORAL DAILY
Qty: 30 CAPSULE | Refills: 0 | Status: SHIPPED | OUTPATIENT
Start: 2020-09-28 | End: 2020-10-28

## 2020-07-29 RX ORDER — DEXTROAMPHETAMINE SACCHARATE, AMPHETAMINE ASPARTATE MONOHYDRATE, DEXTROAMPHETAMINE SULFATE AND AMPHETAMINE SULFATE 7.5; 7.5; 7.5; 7.5 MG/1; MG/1; MG/1; MG/1
30 CAPSULE, EXTENDED RELEASE ORAL DAILY
Qty: 30 CAPSULE | Refills: 0 | Status: SHIPPED | OUTPATIENT
Start: 2020-07-29 | End: 2020-08-28

## 2020-07-29 RX ORDER — DEXTROAMPHETAMINE SACCHARATE, AMPHETAMINE ASPARTATE MONOHYDRATE, DEXTROAMPHETAMINE SULFATE AND AMPHETAMINE SULFATE 7.5; 7.5; 7.5; 7.5 MG/1; MG/1; MG/1; MG/1
30 CAPSULE, EXTENDED RELEASE ORAL DAILY
Qty: 30 CAPSULE | Refills: 0 | Status: SHIPPED | OUTPATIENT
Start: 2020-08-28 | End: 2020-09-27

## 2020-10-26 ENCOUNTER — OFFICE VISIT (OUTPATIENT)
Dept: FAMILY MEDICINE | Facility: CLINIC | Age: 30
End: 2020-10-26

## 2020-10-26 VITALS
WEIGHT: 203.8 LBS | TEMPERATURE: 98.2 F | OXYGEN SATURATION: 96 % | SYSTOLIC BLOOD PRESSURE: 118 MMHG | HEIGHT: 71 IN | HEART RATE: 74 BPM | DIASTOLIC BLOOD PRESSURE: 70 MMHG | BODY MASS INDEX: 28.53 KG/M2

## 2020-10-26 DIAGNOSIS — F90.2 ATTENTION DEFICIT HYPERACTIVITY DISORDER (ADHD), COMBINED TYPE: Primary | ICD-10-CM

## 2020-10-26 DIAGNOSIS — Z23 NEED FOR VACCINATION: ICD-10-CM

## 2020-10-26 PROCEDURE — 90686 IIV4 VACC NO PRSV 0.5 ML IM: CPT | Performed by: FAMILY MEDICINE

## 2020-10-26 PROCEDURE — 90471 IMMUNIZATION ADMIN: CPT | Performed by: FAMILY MEDICINE

## 2020-10-26 PROCEDURE — 99213 OFFICE O/P EST LOW 20 MIN: CPT | Mod: 25 | Performed by: FAMILY MEDICINE

## 2020-10-26 RX ORDER — DEXTROAMPHETAMINE SACCHARATE, AMPHETAMINE ASPARTATE MONOHYDRATE, DEXTROAMPHETAMINE SULFATE AND AMPHETAMINE SULFATE 7.5; 7.5; 7.5; 7.5 MG/1; MG/1; MG/1; MG/1
30 CAPSULE, EXTENDED RELEASE ORAL DAILY
Qty: 30 CAPSULE | Refills: 0 | Status: SHIPPED | OUTPATIENT
Start: 2020-12-27 | End: 2021-01-26

## 2020-10-26 RX ORDER — DEXTROAMPHETAMINE SACCHARATE, AMPHETAMINE ASPARTATE MONOHYDRATE, DEXTROAMPHETAMINE SULFATE AND AMPHETAMINE SULFATE 7.5; 7.5; 7.5; 7.5 MG/1; MG/1; MG/1; MG/1
30 CAPSULE, EXTENDED RELEASE ORAL DAILY
Qty: 30 CAPSULE | Refills: 0 | Status: SHIPPED | OUTPATIENT
Start: 2020-10-26 | End: 2020-11-25

## 2020-10-26 RX ORDER — DEXTROAMPHETAMINE SACCHARATE, AMPHETAMINE ASPARTATE MONOHYDRATE, DEXTROAMPHETAMINE SULFATE AND AMPHETAMINE SULFATE 7.5; 7.5; 7.5; 7.5 MG/1; MG/1; MG/1; MG/1
30 CAPSULE, EXTENDED RELEASE ORAL DAILY
Qty: 30 CAPSULE | Refills: 0 | Status: SHIPPED | OUTPATIENT
Start: 2020-11-26 | End: 2020-12-26

## 2020-10-26 RX ORDER — DEXTROAMPHETAMINE SACCHARATE, AMPHETAMINE ASPARTATE MONOHYDRATE, DEXTROAMPHETAMINE SULFATE AND AMPHETAMINE SULFATE 7.5; 7.5; 7.5; 7.5 MG/1; MG/1; MG/1; MG/1
30 CAPSULE, EXTENDED RELEASE ORAL DAILY
Qty: 30 CAPSULE | Refills: 0 | Status: CANCELLED | OUTPATIENT
Start: 2020-10-26

## 2020-10-26 ASSESSMENT — MIFFLIN-ST. JEOR: SCORE: 1902.59

## 2020-10-26 NOTE — NURSING NOTE
Ady is here for med check    Pre-visit Screening:  Immunizations:  Flu shot today  Colonoscopy:  NA  Mammogram: NA  Asthma Action Test/Plan:  NA  PHQ9:  None  GAD7:  None  Questioned patient about current smoking habits Pt. has never smoked.  Ok to leave detailed message on voice mail for today's visit only Yes, phone # [phone]

## 2020-10-26 NOTE — PROGRESS NOTES
"ADHD Follow Up   SUBJECTIVE:   Adderall XR 30 daily  Are your symptoms controlled? yes  Are you satisfied with your current medication dosage? yes  Are you taking your medication regularly? yes  Are you experiencing any insomnia? no  Are you experiencing any jitteriness? no  Are you experiencing any loss of appetite or weight loss? no  Are you experiencing any other side effects? no  ROS:   CONSTITUTIONAL:NEGATIVE   RESP: NEGATIVE   CV: NEGATIVE   NEURO: NEGATIVE  OBJECTIVE:  /70 (BP Location: Right arm, Patient Position: Sitting, Cuff Size: Adult Large)   Pulse 74   Temp 98.2  F (36.8  C) (Oral)   Ht 1.797 m (5' 10.75\")   Wt 92.4 kg (203 lb 12.8 oz)   SpO2 96%   BMI 28.63 kg/m    S1 and S2 normal, no murmurs, clicks, gallops or rubs. Regular rate and rhythm. Chest is clear; no wheezes or rales. No edema or JVD.     ASSESSMENT:  Per encounter diagnoses.    PLAN:  Per orders.    (F90.2) Attention deficit hyperactivity disorder (ADHD), combined type  (primary encounter diagnosis)  Comment: well controlled  Plan: amphetamine-dextroamphetamine (ADDERALL XR) 30         MG 24 hr capsule        continue current medications at current doses     (Z23) Need for vaccination  Comment:   Plan: FLU VAC PRESRV FREE QUAD SPLIT VIR 3+YRS IM           "

## 2021-01-15 ENCOUNTER — HEALTH MAINTENANCE LETTER (OUTPATIENT)
Age: 31
End: 2021-01-15

## 2021-03-05 ENCOUNTER — VIRTUAL VISIT (OUTPATIENT)
Dept: SLEEP MEDICINE | Facility: CLINIC | Age: 31
End: 2021-03-05
Payer: COMMERCIAL

## 2021-03-05 VITALS — HEIGHT: 71 IN | WEIGHT: 210 LBS | BODY MASS INDEX: 29.4 KG/M2

## 2021-03-05 DIAGNOSIS — G47.9 SLEEP DISTURBANCE: Primary | ICD-10-CM

## 2021-03-05 PROCEDURE — 99204 OFFICE O/P NEW MOD 45 MIN: CPT | Mod: GT | Performed by: INTERNAL MEDICINE

## 2021-03-05 RX ORDER — DEXTROAMPHETAMINE SACCHARATE, AMPHETAMINE ASPARTATE MONOHYDRATE, DEXTROAMPHETAMINE SULFATE AND AMPHETAMINE SULFATE 6.25; 6.25; 6.25; 6.25 MG/1; MG/1; MG/1; MG/1
CAPSULE, EXTENDED RELEASE ORAL EVERY MORNING
COMMUNITY
End: 2021-04-06

## 2021-03-05 ASSESSMENT — MIFFLIN-ST. JEOR: SCORE: 1934.68

## 2021-03-05 NOTE — PROGRESS NOTES
Ady is a 30 year old who is being evaluated via a billable video visit.      How would you like to obtain your AVS? Mail a copy  If the video visit is dropped, the invitation should be resent by: Text to cell phone: [phone]  Will anyone else be joining your video visit? No   Malina Hernandez CMA        Type of service:  Video Visit  Video-Visit Details  Video Start Time: 814AM  Video End Time:852AM    Originating Location (pt. Location): Home    Distant Location (provider location):  [facility]     Platform used for Video Visit: [employer]      Sleep Consultation Note:    Date on this visit: 3/5/2021    Bassam Jacobsen is sent by No ref. provider found for a sleep consultation regarding evaluation for possible sleep apnea    Primary Physician: Morgan Valdez     Chief complaint: I believe I have sleep apnea    Bassam Jacobsen 30 year old with PMH of attention deficit hyperactivity disorder who complains of snoring and nonrestorative sleep. This has been going on for several years.  He has not had a previous sleep study.    Sleep Disordered Breathing  Bassam does report snoring, witnessed apneas, dry mouth,  non-refreshing sleep, fatigue.  Denies snort arousals, choking/gasping for air, morning headaches.    Sleep Schedule/Sleep Complaints//Daytime Functioning   During workdays, Bassam goes to bed at 1030 PM and wakes up at  650AM.  It usually takes 10-20 minutes, sometimes it may take longer if he has active mind.  On non-work days, He falls asleep at 11PM or later/earlier some times and gets up 7-8 AM.  He wakes up 5 times throughout the night.  Night time awakenings occurs due to snoring, rolling over, no apparent reason, very  rarely to use bathroom   After awakening, He is able to fall back asleep after 5 minutes.  He reports non-refreshing sleep, fatigue. Denies EDS.  Patient is a morning person  Patient  denies drowsiness while driving.    Bassam naps sometimes on  weekends  for  minutes, feels refreshed after naps.    Patient does not use electronics in bed.    SLEEP SCALES:  Patient's Tyler Sleepiness score 7/24   Insomnia severity index:20    Restless Legs symptoms  Bassam reports restlessness feelings in the legs rarely and he is not much concerned about it.    Sleep Behaviors  He denies any cataplexy,sleep hallucinations.  Rare episodes of  sleep paralysis  He denies any night time behaviors - sleep walking, sleep talking, sleep eating.  He does not complain acting out dreams.      Social History  Bassam currently works M-Fri 8:00am-4:30pm, sometimes later. , lives with his wife and their 2 yr old daughter and expecting second baby this month.  He drinks tea rarely. He drinks alcohol occasionally.  Does not use alcohol as a sleep aid.  Patient is a never smoked cigarettes. He  rarely smokes cigars, rarely may be once every 4-6 months.   Patient smokes marijuana rarely. Does not use medical marijuana    Allergies:    No Known Allergies    Medications:    Current Outpatient Medications   Medication Sig Dispense Refill     amphetamine-dextroamphetamine (ADDERALL XR) 25 MG 24 hr capsule Take by mouth every morning         Problem List:  Patient Active Problem List    Diagnosis Date Noted     Acute perforated appendicitis 07/07/2017     Priority: Medium     ACP (advance care planning) 07/05/2017     Priority: Medium     Advance Care Planning 7/5/2017: ACP Review of Chart / Resources Provided:  Reviewed chart for advance care plan.  Bassam Jacobsen has no plan or code status on file. Discussed available resources and provided with information.   Added by Olga Baptiste             Attention deficit hyperactivity disorder (ADHD), combined type 07/31/2014     Priority: Medium     [facility] at [facility]- has been on meds  middle school-high school. Off meds last 3-4 year, was prescribed meds last by Lebron Evangelista at [facility]-confirmed with  records    Patient is followed by [facility] for ongoing prescription of narcotic pain medicine.  Med: adderall.   Maximum use per month:   Expected duration:   Narcotic agreement on file: YES  Clinic visit recommended: Q 6  months             Baby premature 26-28 weeks 2014     Priority: Medium     PDA ligation needed       Health Care Home 2015     Priority: Low        Past Medical/Surgical History:  No past medical history on file.  Past Surgical History:   Procedure Laterality Date     ENT SURGERY       HEAD & NECK SURGERY       HERNIA REPAIR       LAPAROSCOPIC APPENDECTOMY N/A 2017    Procedure: LAPAROSCOPIC APPENDECTOMY;  LAPAROSCOPIC APPENDECTOMY;  Surgeon: Christiano Swain MD;  Location: [facility] OR      REPAIR PATENT DUCT ARTERIOSUS         Social History:  Social History     Socioeconomic History     Marital status:      Spouse name: [place]     Number of children: Not on file     Years of education: Not on file     Highest education level: Not on file   Occupational History     Occupation: metal work   Social Needs     Financial resource strain: Not on file     Food insecurity     Worry: Not on file     Inability: Not on file     Transportation needs     Medical: Not on file     Non-medical: Not on file   Tobacco Use     Smoking status: Former Smoker     Smokeless tobacco: Never Used     Tobacco comment: Very light smoker   Substance and Sexual Activity     Alcohol use: Yes     Alcohol/week: 1.7 standard drinks     Types: 2 Standard drinks or equivalent per week     Drug use: No     Sexual activity: Yes     Partners: Female     Birth control/protection: OCP   Lifestyle     Physical activity     Days per week: Not on file     Minutes per session: Not on file     Stress: Not on file   Relationships     Social connections     Talks on phone: Not on file     Gets together: Not on file     Attends Sabianism service: Not on file     Active member of club or organization:  Not on file     Attends meetings of clubs or organizations: Not on file     Relationship status: Not on file     Intimate partner violence     Fear of current or ex partner: Not on file     Emotionally abused: Not on file     Physically abused: Not on file     Forced sexual activity: Not on file   Other Topics Concern     Parent/sibling w/ CABG, MI or angioplasty before 65F 55M? Not Asked   Social History Narrative     Not on file       Family History:  Family history pertinent to sleep disorders: dad has KHAI  Family History   Problem Relation Age of Onset     C.A.D. No family hx of      Diabetes No family hx of      Cerebrovascular Disease No family hx of      Coronary Artery Disease No family hx of      Colon Cancer No family hx of      Prostate Cancer No family hx of        Review of Systems:  A complete review of systems reviewed by me is negative with the exeption of what has been mentioned in the history of present illness,  and symptoms listed below, highlighted in red:  CONSTITUTIONAL: weight gain/loss, fever, chills, sweats or night sweats, drug allergies.  EYES:  changes in vision, blind spots, double vision.  ENT: ear pain, sore throat, sinus pain, post-nasal drip, runny nose, bloody nose  CARDIAC:  fast heartbeats or fluttering in chest, chest pain or pressure, breathlessness when lying flat, swollen legs or swollen feet.  NEUROLOGIC: headaches, weakness or numbness in the arms or legs.  DERMATOLOGIC:  rashes, new moles or change in mole(s)  PULMONARY: SOB at rest, SOB with activity, dry cough, productive cough, coughing up blood, wheezing or whistling when breathing.    GASTROINTESTINAL:  nausea or vomitting, loose or watery stools, fat or grease in stools, constipation, abdominal pain, bowel movements black in color or blood noted.  GENITOURINARY: pain during urination, blood in urine, urinating more frequently than usual  MUSCULOSKELETAL:muscle pain, bone or joint pain, swollen joints.  ENDOCRINE:  "increased thirst or urination, diabetes.  LYMPHATICS: swollen lymph nodes, lumps or bumps in the breasts or nipple discharge.  PSYCHE: depression, anxiety, denies suicidal ideations/thoughts of harming others    Physical Examination:  Vitals: Ht 1.803 m (5' 11\")   Wt 95.3 kg (210 lb)   BMI 29.29 kg/m    BMI= Body mass index is 29.29 kg/m .         [place] Total Score 3/5/2021   Total score - [place] 7     General: No apparent distress, appropriately groomed  Head: Normocephalic, atraumatic  Eyes: no icterus  Neck:Circumference:15 inches  Chest: No cough, no audible wheezing, able to talk in full sentences  Skin: no rash  Psych: coherent speech, normal rate and volume, able to articulate logical thoughts, able   to abstract reason, no tangential thoughts, no hallucinations   or delusions  His affect is normal  Neuro:  Mental status: Alert and  Oriented X 3  Speech: normal   No focal neurological deficit       Impression/Plan:    Snoring, observed apneas,  non restorative sleep, fatigue.  Possible Obstructive sleep apnea  STOP BANG score is 4/8.  Patient likely has sleep apnea based on clinical history, positive family history for KHAI, male gender and body habitus.  HST/ Polysomnography reviewed. Patient is interested in pursuing in-lab sleep study .   Orders generated in the epic for supervised polysomnography.  If the insurance does not cover the in lab study we will consider home sleep study and he was agreeable with the plan.  Obstructive sleep apnea reviewed.  Complications of untreated sleep apnea were reviewed .   Information provided regarding treatment options for KHAI.     Intermittent Restless legs syndrome: He reports that the  symptoms occur very rarely and is not much concerned.  He  is not interested in obtaining any lab work- serum ferritin.  He was instructed to get back to me if there is any increase in the frequency or severity of symptoms and he agreed. Encouraged to try non-pharmacological " "treatments as well - hot bath/shower, stretching, heating pads, avoiding caffeine/alcohol/chocolate prior to bed.    Insomnia-psychophysiological, possible untreated KHAI  We discussed stimulus control measures. Encouraged to follow good sleep hygiene/behavioral techniques.  Patient was instructed to follow a regular sleep schedule aiming at obtaining 7 to 8 hours of sleep per night and avoiding sleep deprivation    Above ideal body weight : We discussed weight management with healthy diet, and exercise.    ADHD: He is currently being treated with Adderall XR 25 mg 24 hr capsules once daily.    Patient  has been encouraged to avoid smoking cigars/ marijuana even though he does it very rarely.    Patient was strongly advised to avoid driving, operating any heavy machinery or other hazardous situations while drowsy or sleepy.  Patient was counseled on the importance of driving while alert, to pull over if drowsy, or nap before getting into the vehicle if sleepy.      Plan is to communicate results of sleep study in 10-14 days.     CC: No ref. provider found    The above note was dictated using voice recognition software. Although reviewed after completion, some word and grammatical error may remain . Please contact the author for any clarifications.    \"I spent a total of 50 minutes with Bassam Jacobsen during today's video visit,most  of this time was spent counseling the patient and  coordinating care regarding  KHAI, HST/PSG, insomnia, weight management, RLS, chart review., including documentation and further activities as noted above.\" \"     Tiara Vogt MD  [facility]   [address], [address]   [address] [place] [address]   Appointments: [phone]   "

## 2021-03-05 NOTE — PATIENT INSTRUCTIONS
Your BMI is Body mass index is 29.29 kg/m .  Weight management is a personal decision.  If you are interested in exploring weight loss strategies, the following discussion covers the approaches that may be successful. Body mass index (BMI) is one way to tell whether you are at a healthy weight, overweight, or obese. It measures your weight in relation to your height.  A BMI of 18.5 to 24.9 is in the healthy range. A person with a BMI of 25 to 29.9 is considered overweight, and someone with a BMI of 30 or greater is considered obese. More than two-thirds of American adults are considered overweight or obese.  Being overweight or obese increases the risk for further weight gain. Excess weight may lead to heart disease and diabetes.  Creating and following plans for healthy eating and physical activity may help you improve your health.  Weight control is part of healthy lifestyle and includes exercise, emotional health, and healthy eating habits. Careful eating habits lifelong are the mainstay of weight control. Though there are significant health benefits from weight loss, long-term weight loss with diet alone may be very difficult to achieve- studies show long-term success with dietary management in less than 10% of people. Attaining a healthy weight may be especially difficult to achieve in those with severe obesity. In some cases, medications, devices and surgical management might be considered.  What can you do?  If you are overweight or obese and are interested in methods for weight loss, you should discuss this with your provider.     Consider reducing daily calorie intake by 500 calories.     Keep a food journal.     Avoiding skipping meals, consider cutting portions instead.    Diet combined with exercise helps maintain muscle while optimizing fat loss. Strength training is particularly important for building and maintaining muscle mass. Exercise helps reduce stress, increase energy, and improves fitness.  "Increasing exercise without diet control, however, may not burn enough calories to loose weight.       Start walking three days a week 10-20 minutes at a time    Work towards walking thirty minutes five days a week     Eventually, increase the speed of your walking for 1-2 minutes at time    In addition, we recommend that you review healthy lifestyles and methods for weight loss available through the National Institutes of Health patient information sites:  http://win.niddk.nih.gov/publications/index.htm    And look into health and wellness programs that may be available through your health insurance provider, employer, local community center, or denzel club.    Weight management plan: Patient was referred to their PCP to discuss a diet and exercise plan.    MY TREATMENT INFORMATION FOR SLEEP APNEA-  Bassam Jacobsen    DOCTOR : Tiara Vogt MD    Am I having a sleep study at a sleep center?  --->Due to insurance clearance delays, you will be contacted within 2-4 weeks to schedule    Am I having a home sleep study?  --->Watch the video for the device you are using:    /drop off device-   https://www.Aireon.com/watch?v=yGGFBdELGhk    Disposable device sent out require phone/computer application-   https://www.SDI-Solutionube.com/watch?v=BCce_vbiwxE      Frequently asked questions:  1. What is Obstructive Sleep Apnea (KHAI)? KHAI is the most common type of sleep apnea. Apnea means, \"without breath.\"  Apnea is most often caused by narrowing or collapse of the upper airway as muscles relax during sleep.   Almost everyone has occasional apneas. Most people with sleep apnea have had brief interruptions at night frequently for many years.  The severity of sleep apnea is related to how frequent and severe the events are.   2. What are the consequences of KHAI? Symptoms include: feeling sleepy during the day, snoring loudly, gasping or stopping of breathing, trouble sleeping, and occasionally morning " headaches or heartburn at night.  Sleepiness can be serious and even increase the risk of falling asleep while driving. Other health consequences may include development of high blood pressure and other cardiovascular disease in persons who are susceptible. Untreated KHAI  can contribute to heart disease, stroke and diabetes.   3. What are the treatment options? In most situations, sleep apnea is a lifelong disease that must be managed with daily therapy. Medications are not effective for sleep apnea and surgery is generally not considered until other therapies have been tried. Your treatment is your choice . Continuous Positive Airway (CPAP) works right away and is the therapy that is effective in nearly everyone. An oral device to hold your jaw forward is usually the next most reliable option. Other options include postioning devices (to keep you off your back), weight loss, and surgery including a tongue pacing device. There is more detail about some of these options below.  4. Are my sleep studies covered by insurance? Although we will request verification of coverage, we advise you also check in advance of the study to ensure there is coverage.    Important tips for those choosing CPAP and similar devices   Know your equipment:  CPAP is continuous positive airway pressure that prevents obstructive sleep apnea by keeping the throat from collapsing while you are sleeping. In most cases, the device is  smart  and can slowly self-adjusts if your throat collapses and keeps a record every day of how well you are treated-this information is available to you and your care team.  BPAP is bilevel positive airway pressure that keeps your throat open and also assists each breath with a pressure boost to maintain adequate breathing.  Special kinds of BPAP are used in patients who have inadequate breathing from lung or heart disease. In most cases, the device is  smart  and can slowly self-adjusts to assist breathing. Like  CPAP, the device keeps a record of how well you are treated.  Your mask is your connection to the device. You get to choose what feels most comfortable and the staff will help to make sure if fits. Here: are some examples of the different masks that are available:       Key points to remember on your journey with sleep apnea:  1. Sleep study.  PAP devices often need to be adjusted during a sleep study to show that they are effective and adjusted right.  2. Good tips to remember: Try wearing just the mask during a quiet time during the day so your body adapts to wearing it. A humidifier is recommended for comfort in most cases to prevent drying of your nose and throat. Allergy medication from your provider may help you if you are having nasal congestion.  3. Getting settled-in. It takes more than one night for most of us to get used to wearing a mask. Try wearing just the mask during a quiet time during the day so your body adapts to wearing it. A humidifier is recommended for comfort in most cases. Our team will work with you carefully on the first day and will be in contact within 4 days and again at 2 and 4 weeks for advice and remote device adjustments. Your therapy is evaluated by the device each day.   4. Use it every night. The more you are able to sleep naturally for 7-8 hours, the more likely you will have good sleep and to prevent health risks or symptoms from sleep apnea. Even if you use it 4 hours it helps. Occasionally all of us are unable to use a medical therapy, in sleep apnea, it is not dangerous to miss one night.   5. Communicate. Call our skilled team on the number provided on the first day if your visit for problems that make it difficult to wear the device. Over 2 out of 3 patients can learn to wear the device long-term with help from our team. Remember to call our team or your sleep providers if you are unable to wear the device as we may have other solutions for those who cannot adapt to mask  CPAP therapy. It is recommended that you sleep your sleep provider within the first 3 months and yearly after that if you are not having problems.   6. Use it for your health. We encourage use of CPAP masks during daytime quiet periods to allow your face and brain to adapt to the sensation of CPAP so that it will be a more natural sensation to awaken to at night or during naps. This can be very useful during the first few weeks or months of adapting to CPAP though it does not help medically to wear CPAP during wakefulness and  should not be used as a strategy just to meet guidelines.  7. Take care of your equipment. Make sure you clean your mask and tubing using directions every day and that your filter and mask are replaced as recommended or if they are not working.     BESIDES CPAP, WHAT OTHER THERAPIES ARE THERE?    Positioning Device  Positioning devices are generally used when sleep apnea is mild and only occurs on your back.This example shows a pillow that straps around the waist. It may be appropriate for those whose sleep study shows milder sleep apnea that occurs primarily when lying flat on one's back. Preliminary studies have shown benefit but effectiveness at home may need to be verified by a home sleep test. These devices are generally not covered by medical insurance.  Examples of devices that maintain sleeping on the back to prevent snoring and mild sleep apnea.    Belt type body positioner  http://MakuCell.Remedy Systems/    Electronic reminder  http://nightshifttherapy.com/  http://www.Sepaton.Remedy Systems.au/      Oral Appliance  What is oral appliance therapy?  An oral appliance device fits on your teeth at night like a retainer used after having braces. The device is made by a specialized dentist and requires several visits over 1-2 months before a manufactured device is made to fit your teeth and is adjusted to prevent your sleep apnea. Once an oral device is working properly, snoring should be improved. A home  sleep test may be recommended at that time if to determine whether the sleep apnea is adequately treated.       Some things to remember:  -Oral devices are often, but not always, covered by your medical insurance. Be sure to check with your insurance provider.   -If you are referred for oral therapy, you will be given a list of specialized dentists to consider or you may choose to visit the Web site of the American Academy of Dental Sleep Medicine  -Oral devices are less likely to work if you have severe sleep apnea or are extremely overweight.     More detailed information  An oral appliance is a small acrylic device that fits over the upper and lower teeth  (similar to a retainer or a mouth guard). This device slightly moves jaw forward, which moves the base of the tongue forward, opens the airway, improves breathing for effective treat snoring and obstructive sleep apnea in perhaps 7 out of 10 people .  The best working devices are custom-made by a dental device  after a mold is made of the teeth 1, 2, 3.  When is an oral appliance indicated?  Oral appliance therapy is recommended as a first-line treatment for patients with primary snoring, mild sleep apnea, and for patients with moderate sleep apnea who prefer appliance therapy to use of CPAP4, 5. Severity of sleep apnea is determined by sleep testing and is based on the number of respiratory events per hour of sleep.   How successful is oral appliance therapy?  The success rate of oral appliance therapy in patients with mild sleep apnea is 75-80% while in patients with moderate sleep apnea it is 50-70%. The chance of success in patients with severe sleep apnea is 40-50%. The research also shows that oral appliances have a beneficial effect on the cardiovascular health of KHAI patients at the same magnitude as CPAP therapy7.  Oral appliances should be a second-line treatment in cases of severe sleep apnea, but if not completely successful then a  combination therapy utilizing CPAP plus oral appliance therapy may be effective. Oral appliances tend to be effective in a broad range of patients although studies show that the patients who have the highest success are females, younger patients, those with milder disease, and less severe obesity. 3, 6.   Finding a dentist that practices dental sleep medicine  Specific training is available through the American Academy of Dental Sleep Medicine for dentists interested in working in the field of sleep. To find a dentist who is educated in the field of sleep and the use of oral appliances, near you, visit the Web site of the American Academy of Dental Sleep Medicine.    References  1. Whitley et al. Objectively measured vs self-reported compliance during oral appliance therapy for sleep-disordered breathing. Chest 2013; 144(5): 8651-6481.  2. Robe et al. Objective measurement of compliance during oral appliance therapy for sleep-disordered breathing. Thorax 2013; 68(1): 91-96.  3. Marva, et al. Mandibular advancement devices in 620 men and women with KHAI and snoring: tolerability and predictors of treatment success. Chest 2004; 125: 6954-1636.  4. Janey, et al. Oral appliances for snoring and KHAI: a review. Sleep 2006; 29: 244-262.  5. Yue, et al. Oral appliance treatment for KHAI: an update. J Clin Sleep Med 2014; 10(2): 215-227.  6. Shahida et al. Predictors of OSAH treatment outcome. J Dent Res 2007; 86: 2692-3793.      Weight Loss:    Weight loss is a long-term strategy that may improve sleep apnea in some patients.    Weight management is a personal decision and the decision should be based on your interest and the potential benefits.  If you are interested in exploring weight loss strategies, the following discussion covers the impact on weight loss on sleep apnea and the approaches that may be successful.    Being overweight does not necessarily mean you will have health consequences.   Those who have BMI over 35 or over 27 with existing medical conditions carries greater risk.   Weight loss decreases severity of sleep apnea in most people with obesity. For those with mild obesity who have developed snoring with weight gain, even 15-30 pound weight loss can improve and occasionally eliminate sleep apnea.  Structured and life-long dietary and health habits are necessary to lose weight and keep healthier weight levels.     Though there may be significant health benefits from weight loss, long-term weight loss is very difficult to achieve- studies show success with dietary management in less than 10% of people. In addition, substantial weight loss may require years of dietary control and may be difficult if patients have severe obesity. In these cases, surgical management may be considered.  Finally, older individuals who have tolerated obesity without health complications may be less likely to benefit from weight loss strategies.        Your BMI is Body mass index is 29.29 kg/m .  Weight management is a personal decision.  If you are interested in exploring weight loss strategies, the following discussion covers the approaches that may be successful. Body mass index (BMI) is one way to tell whether you are at a healthy weight, overweight, or obese. It measures your weight in relation to your height.  A BMI of 18.5 to 24.9 is in the healthy range. A person with a BMI of 25 to 29.9 is considered overweight, and someone with a BMI of 30 or greater is considered obese. More than two-thirds of American adults are considered overweight or obese.  Being overweight or obese increases the risk for further weight gain. Excess weight may lead to heart disease and diabetes.  Creating and following plans for healthy eating and physical activity may help you improve your health.  Weight control is part of healthy lifestyle and includes exercise, emotional health, and healthy eating habits. Careful eating habits  lifelong are the mainstay of weight control. Though there are significant health benefits from weight loss, long-term weight loss with diet alone may be very difficult to achieve- studies show long-term success with dietary management in less than 10% of people. Attaining a healthy weight may be especially difficult to achieve in those with severe obesity. In some cases, medications, devices and surgical management might be considered.  What can you do?  If you are overweight or obese and are interested in methods for weight loss, you should discuss this with your provider.     Consider reducing daily calorie intake by 500 calories.     Keep a food journal.     Avoiding skipping meals, consider cutting portions instead.    Diet combined with exercise helps maintain muscle while optimizing fat loss. Strength training is particularly important for building and maintaining muscle mass. Exercise helps reduce stress, increase energy, and improves fitness. Increasing exercise without diet control, however, may not burn enough calories to loose weight.       Start walking three days a week 10-20 minutes at a time    Work towards walking thirty minutes five days a week     Eventually, increase the speed of your walking for 1-2 minutes at time    In addition, we recommend that you review healthy lifestyles and methods for weight loss available through the National Institutes of Health patient information sites:  http://win.niddk.nih.gov/publications/index.htm    And look into health and wellness programs that may be available through your health insurance provider, employer, local community center, or denzel club.      Surgery:    Surgery for obstructive sleep apnea is considered generally only when other therapies fail to work. Surgery may be discussed with you if you are having a difficult time tolerating CPAP and or when there is an abnormal structure that requires surgical correction.  Nose and throat surgeries often  enlarge the airway to prevent collapse.  Most of these surgeries create pain for 1-2 weeks and up to half of the most common surgeries are not effective throughout life.  You should carefully discuss the benefits and drawbacks to surgery with your sleep provider and surgeon to determine if it is the best solution for you.   More information  Surgery for KHAI is directed at areas that are responsible for narrowing or complete obstruction of the airway during sleep.  There are a wide range of procedures available to enlarge and/or stabilize the airway to prevent blockage of breathing in the three major areas where it can occur: the palate, tongue, and nasal regions.  Successful surgical treatment depends on the accurate identification of the factors responsible for obstructive sleep apnea in each person.  A personalized approach is required because there is no single treatment that works well for everyone.  Because of anatomic variation, consultation with an examination by a sleep surgeon is a critical first step in determining what surgical options are best for each patient.  In some cases, examination during sedation may be recommended in order to guide the selection of procedures.  Patients will be counseled about risks and benefits as well as the typical recovery course after surgery. Surgery is typically not a cure for a person s KHAI.  However, surgery will often significantly improve one s KHAI severity (termed  success rate ).  Even in the absence of a cure, surgery will decrease the cardiovascular risk associated with OSA7; improve overall quality of life8 (sleepiness, functionality, sleep quality, etc).      Palate Procedures:  Patients with KHAI often have narrowing of their airway in the region of their tonsils and uvula.  The goals of palate procedures are to widen the airway in this region as well as to help the tissues resist collapse.  Modern palate procedure techniques focus on tissue conservation and  soft tissue rearrangement, rather than tissue removal.  Often the uvula is preserved in this procedure. Residual sleep apnea is common in patient after pharyngoplasty with an average reduction in sleep apnea events of 33%2.      Tongue Procedures:  ExamWhile patients are awake, the muscles that surround the throat are active and keep this region open for breathing. These muscles relax during sleep, allowing the tongue and other structures to collapse and block breathing.  There are several different tongue procedures available.  Selection of a tongue base procedure depends on characteristics seen on physical exam.  Generally, procedures are aimed at removing bulky tissues in this area or preventing the back of the tongue from falling back during sleep.  Success rates for tongue surgery range from 50-62%3.    Hypoglossal Nerve Stimulation:  Hypoglossal nerve stimulation has recently received approval from the [place] Food and Drug Administration for the treatment of obstructive sleep apnea.  This is based on research showing that the system was safe and effective in treating sleep apnea6.  Results showed that the median AHI score decreased 68%, from 29.3 to 9.0. This therapy uses an implant system that senses breathing patterns and delivers mild stimulation to airway muscles, which keeps the airway open during sleep.  The system consists of three fully implanted components: a small generator (similar in size to a pacemaker), a breathing sensor, and a stimulation lead.  Using a small handheld remote, a patient turns the therapy on before bed and off upon awakening.    Candidates for this device must be greater than 22 years of age, have moderate to severe KHAI (AHI between 20-65), BMI less than 32, have tried CPAP/oral appliance without success, and have appropriate upper airway anatomy (determined by a sleep endoscopy performed by Dr. Negrete).    Hypoglossal Nerve Stimulation Pathway:    The sleep surgeon s office  will work with the patient through the insurance prior-authorization process (including communications and appeals).    Nasal Procedures:  Nasal obstruction can interfere with nasal breathing during the day and night.  Studies have shown that relief of nasal obstruction can improve the ability of some patients to tolerate positive airway pressure therapy for obstructive sleep apnea1.  Treatment options include medications such as nasal saline, topical corticosteroid and antihistamine sprays, and oral medications such as antihistamines or decongestants. Non-surgical treatments can include external nasal dilators for selected patients. If these are not successful by themselves, surgery can improve the nasal airway either alone or in combination with these other options.      Combination Procedures:  Combination of surgical procedures and other treatments may be recommended, particularly if patients have more than one area of narrowing or persistent positional disease.  The success rate of combination surgery ranges from 66-80%2,3.    References  1. Norm GAFFNEY. The Role of the Nose in Snoring and Obstructive Sleep Apnoea: An Update.  Eur Arch Otorhinolaryngol. 2011; 268: 1365-73.  2.  Quin SM; Ely JA; Eriberto JR; Pallanch JF; Christiana MB; Maxim SG; Berta REYES. Surgical modifications of the upper airway for obstructive sleep apnea in adults: a systematic review and meta-analysis. SLEEP 2010;33(10):5854-3897. Vj BRADFORD. Hypopharyngeal surgery in obstructive sleep apnea: an evidence-based medicine review.  Arch Otolaryngol Head Neck Surg. 2006 Feb;132(2):206-13.  3. Andrea YH1, Gonzalo Y, William DANIEL. The efficacy of anatomically based multilevel surgery for obstructive sleep apnea. Otolaryngol Head Neck Surg. 2003 Oct;129(4):327-35.  4. Vj BRADFORD, Goldberg A. Hypopharyngeal Surgery in Obstructive Sleep Apnea: An Evidence-Based Medicine Review. Arch Otolaryngol Head Neck Surg. 2006 Feb;132(2):206-13.  5. Jenifer DAVALOS et al.  Upper-Airway Stimulation for Obstructive Sleep Apnea.  N Engl J Med. 2014 Jan 9;370(2):139-49.  6. Danii Y et al. Increased Incidence of Cardiovascular Disease in Middle-aged Men with Obstructive Sleep Apnea. Am J Respir Crit Care Med; 2002 166: 159-165  7. Gladys WALLACE et al. Studying Life Effects and Effectiveness of Palatopharyngoplasty (SLEEP) study: Subjective Outcomes of Isolated Uvulopalatopharyngoplasty. Otolaryngol Head Neck Surg. 2011; 144: 623-631.

## 2021-03-11 ENCOUNTER — TELEPHONE (OUTPATIENT)
Dept: SLEEP MEDICINE | Facility: CLINIC | Age: 31
End: 2021-03-11

## 2021-03-29 ENCOUNTER — TELEPHONE (OUTPATIENT)
Dept: SLEEP MEDICINE | Facility: CLINIC | Age: 31
End: 2021-03-29

## 2021-03-29 NOTE — TELEPHONE ENCOUNTER
Peer to Peer Request    Patient Name:                        Bassam Jacobsen  :                                      1990  MRN:                                      [id number]    Dr. Gerber,    The authorization for procedure [facility] on date of service 2021 has been denied. We were unsuccessful in obtaining approval through clinical review. A [id number] review can be done by calling the insurance/third party authorization vendor with the following information:    Insurance:       [place] Vendor:   [email or web address]  Phone:             [phone]  Due:                ASAP within 21 days    Patient ID:       [id number]  Case/Ref #:     [id number]    Denial Reason: Lacks qualifying co-morbid conditions and no previous HST completion    Please complete this as soon as you are able and let me know when it is done.    Thank you,    Miley GELLER  [facility]

## 2021-04-02 ENCOUNTER — TELEPHONE (OUTPATIENT)
Dept: SLEEP MEDICINE | Facility: CLINIC | Age: 31
End: 2021-04-02

## 2021-04-02 NOTE — TELEPHONE ENCOUNTER
Spoke to patient this morning. Informed him that his in lab study was not covered and that I would forward a message to his provider asking her what type of home test she would like him scheduled for. I will call pt once I have an answer.

## 2021-04-04 DIAGNOSIS — G47.9 SLEEP DISTURBANCE: Primary | ICD-10-CM

## 2021-04-06 ENCOUNTER — OFFICE VISIT (OUTPATIENT)
Dept: FAMILY MEDICINE | Facility: CLINIC | Age: 31
End: 2021-04-06

## 2021-04-06 VITALS
HEIGHT: 71 IN | BODY MASS INDEX: 27.89 KG/M2 | SYSTOLIC BLOOD PRESSURE: 116 MMHG | RESPIRATION RATE: 20 BRPM | WEIGHT: 199.2 LBS | HEART RATE: 60 BPM | DIASTOLIC BLOOD PRESSURE: 62 MMHG | TEMPERATURE: 97.9 F

## 2021-04-06 DIAGNOSIS — F90.2 ATTENTION DEFICIT HYPERACTIVITY DISORDER (ADHD), COMBINED TYPE: Primary | ICD-10-CM

## 2021-04-06 PROCEDURE — 99213 OFFICE O/P EST LOW 20 MIN: CPT | Performed by: FAMILY MEDICINE

## 2021-04-06 RX ORDER — DEXTROAMPHETAMINE SACCHARATE, AMPHETAMINE ASPARTATE MONOHYDRATE, DEXTROAMPHETAMINE SULFATE AND AMPHETAMINE SULFATE 7.5; 7.5; 7.5; 7.5 MG/1; MG/1; MG/1; MG/1
30 CAPSULE, EXTENDED RELEASE ORAL DAILY
Qty: 30 CAPSULE | Refills: 0 | Status: SHIPPED | OUTPATIENT
Start: 2021-04-06 | End: 2021-05-06

## 2021-04-06 RX ORDER — DEXTROAMPHETAMINE SACCHARATE, AMPHETAMINE ASPARTATE MONOHYDRATE, DEXTROAMPHETAMINE SULFATE AND AMPHETAMINE SULFATE 7.5; 7.5; 7.5; 7.5 MG/1; MG/1; MG/1; MG/1
30 CAPSULE, EXTENDED RELEASE ORAL DAILY
Qty: 30 CAPSULE | Refills: 0 | Status: SHIPPED | OUTPATIENT
Start: 2021-06-07 | End: 2021-07-07

## 2021-04-06 RX ORDER — DEXTROAMPHETAMINE SACCHARATE, AMPHETAMINE ASPARTATE MONOHYDRATE, DEXTROAMPHETAMINE SULFATE AND AMPHETAMINE SULFATE 7.5; 7.5; 7.5; 7.5 MG/1; MG/1; MG/1; MG/1
30 CAPSULE, EXTENDED RELEASE ORAL DAILY
Qty: 30 CAPSULE | Refills: 0 | Status: SHIPPED | OUTPATIENT
Start: 2021-05-07 | End: 2021-06-07

## 2021-04-06 ASSESSMENT — MIFFLIN-ST. JEOR: SCORE: 1881.73

## 2021-04-06 NOTE — PROGRESS NOTES
"ADHD Follow Up   SUBJECTIVE:     Adderall XR 30     Are your symptoms controlled? yes  Are you satisfied with your current medication dosage? yes  Are you taking your medication regularly? yes  Are you experiencing any insomnia? no  Are you experiencing any jitteriness? no  Are you experiencing any loss of appetite or weight loss? no  Are you experiencing any other side effects? no  ROS:   CONSTITUTIONAL:NEGATIVE   RESP: NEGATIVE   CV: NEGATIVE   NEURO: NEGATIVE  OBJECTIVE:  /62 (BP Location: Right arm, Patient Position: Chair, Cuff Size: Adult Regular)   Pulse 60   Temp 97.9  F (36.6  C)   Resp 20   Ht 1.797 m (5' 10.75\")   Wt 90.4 kg (199 lb 3.2 oz)   BMI 27.98 kg/m    S1 and S2 normal, no murmurs, clicks, gallops or rubs. Regular rate and rhythm. Chest is clear; no wheezes or rales. No edema or JVD.     ASSESSMENT:  (F90.2) Attention deficit hyperactivity disorder (ADHD), combined type  (primary encounter diagnosis)  Comment: well controlled, continue current medications at current doses   Plan: amphetamine-dextroamphetamine (ADDERALL XR) 30         MG 24 hr capsule, amphetamine-dextroamphetamine        (ADDERALL XR) 30 MG 24 hr capsule,         amphetamine-dextroamphetamine (ADDERALL XR) 30         MG 24 hr capsule        Recheck 6 mo,  reviewed     "

## 2021-04-06 NOTE — NURSING NOTE
Bassam Jacobsen is here for a medication check and refill.    Questioned patient about current smoking habits.  Pt. quit smoking some time ago.  PULSE regular  My Chart: active  CLASSIFICATION OF OVERWEIGHT AND OBESITY BY BMI                        Obesity Class           BMI(kg/m2)  Underweight                                    < 18.5  Normal                                         18.5-24.9  Overweight                                     25.0-29.9  OBESITY                     I                  30.0-34.9                             II                 35.0-39.9  EXTREME OBESITY             III                >40                            Patient's  BMI Body mass index is 27.98 kg/m .  http://hin.nhlbi.nih.gov/menuplanner/menu.cgi  Pre-visit planning  Immunizations - up to date  Colonoscopy -   Mammogram -   Asthma -   PHQ9 -    GRAHAM-7 -    Hearing Test -

## 2021-05-13 ENCOUNTER — OFFICE VISIT (OUTPATIENT)
Dept: SLEEP MEDICINE | Facility: CLINIC | Age: 31
End: 2021-05-13

## 2021-05-13 ENCOUNTER — DOCUMENTATION ONLY (OUTPATIENT)
Dept: SLEEP MEDICINE | Facility: CLINIC | Age: 31
End: 2021-05-13

## 2021-05-13 DIAGNOSIS — G47.9 SLEEP DISTURBANCE: Primary | ICD-10-CM

## 2021-05-14 ENCOUNTER — DOCUMENTATION ONLY (OUTPATIENT)
Dept: SLEEP MEDICINE | Facility: CLINIC | Age: 31
End: 2021-05-14

## 2021-05-14 NOTE — PROGRESS NOTES
The HST was returned but did not record properly.  Patient will need to be called to the repeat test. Staff was notified test is a redo and charges were deleted.

## 2021-05-17 ENCOUNTER — OFFICE VISIT (OUTPATIENT)
Dept: SLEEP MEDICINE | Facility: CLINIC | Age: 31
End: 2021-05-17
Payer: COMMERCIAL

## 2021-05-17 ENCOUNTER — DOCUMENTATION ONLY (OUTPATIENT)
Dept: SLEEP MEDICINE | Facility: CLINIC | Age: 31
End: 2021-05-17

## 2021-05-17 DIAGNOSIS — G47.9 SLEEP DISTURBANCE: Primary | ICD-10-CM

## 2021-05-17 PROCEDURE — G0399 HOME SLEEP TEST/TYPE 3 PORTA: HCPCS | Performed by: INTERNAL MEDICINE

## 2021-05-18 ENCOUNTER — DOCUMENTATION ONLY (OUTPATIENT)
Dept: SLEEP MEDICINE | Facility: CLINIC | Age: 31
End: 2021-05-18

## 2021-05-18 DIAGNOSIS — G47.9 SLEEP DISTURBANCE: ICD-10-CM

## 2021-05-18 NOTE — PROGRESS NOTES
This HSAT was performed using a Noxturnal T3 device which recorded snore, sound, movement activity, body position, nasal pressure, oronasal thermal airflow, pulse, oximetry and both chest and abdominal respiratory effort. HSAT data was restricted to the time patient states they were in bed.     HSAT was scored using 1B 4% hypopnea rule.     HST AHI (Non-PAT): 13  Snoring was reported as moderate, loud and intermittent.  Time with SpO2 below 89% was 20.5 minutes.   Overall signal quality was good.     Pt will follow up with sleep provider to determine appropriate therapy.         HST POST-STUDY QUESTIONNAIRE    1. What time did you go to bed?  10:15 p.m.  2. How long do you think it took to fall asleep?  2 - 3 min  3. What time did you wake up to start the day?  6:30 a.m.  4. Did you get up during the night at all?  no  5. If you woke up, do you remember approximately what time(s)? n/a  6. Did you have any difficulty with the equipment?  No  7. Did you us any type of treatment with this study?  None  8. Was the head of the bed elevated? No  9. Did you sleep in a recliner?  No  10. Did you stop using CPAP at least 3 days before this test?  NA  11. Any other information you'd like us to know? no

## 2021-05-26 NOTE — PROCEDURES
"HOME SLEEP STUDY INTERPRETATION    Patient: Bassam Jacobsen  MRN: [id number]  YOB: 1990  Study Date: 2021  Referring Provider: Morgan Valdez  Ordering Provider: Tiara Vogt MD     Indications for Home Study: Bassam Jacobsen is a 30 year old male with a history of attention deficit hyperactivity disorder who presents with symptoms suggestive of obstructive sleep apnea.    Height: 71 inches ; Weight: 210 pounds        BMI: 29.3 kg/m   Total score - [place]: 7 (3/5/2021  7:00 AM)  STOP-BAN/8    Data: A full night home sleep study was performed recording the standard physiologic parameters including body position, movement, sound, nasal pressure, thermal oral airflow, chest and abdominal movements with respiratory inductance plethysmography, and oxygen saturation by pulse oximetry. Pulse rate was estimated by oximetry recording. This study was considered adequate based on > 4 hours of quality oximetry and respiratory recording. As specified by the AASM Manual for the Scoring of Sleep and Associated events, version 2.3, Rule VIII.D 1B, 4% oxygen desaturation scoring for hypopneas is used as a standard of care on all home sleep apnea testing.    Analysis Time: 557.4 minutes    Respiration:   Sleep Associated Hypoxemia: sustained hypoxemia was not present. Baseline oxygen saturation was ***%.  Time with saturation less than or equal to 88% was *** minutes. The lowest oxygen saturation was ***%.   Snoring: Snoring was {PRESENT/ABSENT:497633839::\"present\"}.  Respiratory events: The home study revealed a presence of *** obstructive apneas and *** mixed and central apneas. There were *** hypopneas resulting in a combined apnea/hypopnea index [AHI] of *** events per hour.  AHI was *** per hour supine, *** per hour prone, *** per hour on left side, and *** per hour on right side.   Pattern: Excluding events noted above, respiratory rate and pattern was " "{abnormal/normal:476273::\"Normal\"}.    Position: Percent of time spent: supine - ***%, prone - ***%, on left - ***%, on right - ***%.    Heart Rate: By pulse oximetry {TACHYBRADY:767617::\"normal rate\"} was noted.     Assessment:   {MILD MOD:406952} obstructive sleep apnea.  Sleep associated hypoxemia {WAS / WAS NO:903255} present.    Recommendations:  Consider {HSTTREATMENTOPTIONS:125595::\"auto-CPAP at ***-*** cmH2O\",\"oral appliance therapy\",\"positional therapy\"}.  Suggest optimizing sleep hygiene and avoiding sleep deprivation.  Weight management.    Diagnosis Code(s): {HJBNAGDQQJVUPXT31:394773::\"Obstructive Sleep Apnea G47.33\",\"Hypoxemia G47.36\"}    Tiara Vogt MD, May 25, 2021   Diplomate, {[employer].:590969::\"American Board of Pediatrics\",\"American Board of Family Medicine\",\"American Board of Psychiatry and Neurology\",\"American Board of Internal Medicine\"}, Sleep Medicine    "

## 2021-06-07 ENCOUNTER — VIRTUAL VISIT (OUTPATIENT)
Dept: SLEEP MEDICINE | Facility: CLINIC | Age: 31
End: 2021-06-07
Payer: COMMERCIAL

## 2021-06-07 VITALS — WEIGHT: 200 LBS | BODY MASS INDEX: 28 KG/M2 | HEIGHT: 71 IN

## 2021-06-07 DIAGNOSIS — G47.33 OSA (OBSTRUCTIVE SLEEP APNEA): Primary | ICD-10-CM

## 2021-06-07 PROCEDURE — 99214 OFFICE O/P EST MOD 30 MIN: CPT | Mod: TEL | Performed by: INTERNAL MEDICINE

## 2021-06-07 ASSESSMENT — MIFFLIN-ST. JEOR: SCORE: 1889.32

## 2021-06-07 NOTE — PROCEDURES
HOME SLEEP STUDY INTERPRETATION    Patient: Bassam Jacobsen  MRN: [id number]  YOB: 1990  Study Date: 2021  Referring Provider: Morgan Valdez  Ordering Provider: Tiara Vogt MD     Indications for Home Study: Bassam Jacobsen is a 30 year old male with a history of attention deficit hyperactivity disorder  who presents with symptoms suggestive of obstructive sleep apnea.    Height: 71 inches     Weight: 210 pounds    BMI: 29.3 kg/m   Total score - [place]: 7 (3/5/2021  7:00 AM)  STOP-BAN/8    Data: A full night home sleep study was performed recording the standard physiologic parameters including body position, movement, sound, nasal pressure, thermal oral airflow, chest and abdominal movements with respiratory inductance plethysmography, and oxygen saturation by pulse oximetry. Pulse rate was estimated by oximetry recording. This study was considered adequate based on > 4 hours of quality oximetry and respiratory recording. As specified by the AASM Manual for the Scoring of Sleep and Associated events, version 2.3, Rule VIII.D 1B, 4% oxygen desaturation scoring for hypopneas is used as a standard of care on all home sleep apnea testing.    Analysis Time: 479 minutes    Respiration:   Sleep Associated Hypoxemia: sustained hypoxemia was present.  Average oxygen saturation was 92.1%.  Time with saturation less than or equal to 88% was 20.5 minutes. The lowest oxygen saturation was 77%.   Snoring: Snoring was present, moderate to loud in intensity and intermittent  Respiratory events: The home study revealed a presence of 30 obstructive apneas and 4 mixed and central apneas. There were 70 hypopneas resulting in a combined apnea/hypopnea index [AHI] of 13.0 events per hour.  AHI was 79.6 per hour supine, 0 per hour prone, 1.4 per hour on left side, and 0 per hour on right side.   Pattern: Excluding events noted above, respiratory rate and pattern was  Normal.    Position: Percent of time spent: supine -15.7%, prone -8.3%, on left -35.9%, on right -40.1%.    Heart Rate: By pulse oximetry normal rate was noted.     Assessment:     Mild obstructive sleep apnea, predominant during supine sleep position.    Sleep associated hypoxemia was present(oxygen desaturations were associated with the obstructive events during supine sleep).    Recommendations:    Consider the following treatment options for the sleep-related breathing disorder: a) positional therapy during sleep using devices such as zzoma pillow or other similar types of devices or b) dental appliance through referral to sleep dentistry or c) auto CPAP 5-15 cmH2O.    Suggest optimizing sleep hygiene and avoiding sleep deprivation.    Weight management.    Diagnosis Code(s): Obstructive Sleep Apnea G47.33, Hypoxemia G47.36, Snoring R06.83       Tiara Vogt MD, June 7, 2021   Diplomate, American Board of Internal Medicine, Sleep Medicine

## 2021-06-07 NOTE — PROGRESS NOTES
Bassam Jacobsen is a 30 year old who is being evaluated via a billable video visit.      How would you like to obtain your AVS? MyChart  If the video visit is dropped, the invitation should be resent by: Text to cell phone: [phone]   Will anyone else be joining your video visit? No    Does patient have any form of state insurance? NO    Do you have wifi? YES  Do you have a smart phone? YES  Can you download an jeferson on your phone comfortably with out assistance? YES  Can you watch a Youtube video? YES          Lidia Anthony MA    After multiple attempts of failure with due to technical issues with the video visit, at the patient's request, the visit was switched to telephone visit.  Telephone number: [phone]  Telephone start time:148pm       Telephone end time: 2:06pm    [facility]     Sleep clinic follow up visit note    Date on this visit: 6/7/2021    Primary Physician: Morgan Valdez     Chief complaint: Review results of sleep study    Bassam Jacobsen 30 year old with PMH of attention deficit hyperactivity disorder who previously presented to sleep clinic with symptoms suggestive of KHAI.  Home sleep study was obtained on 5/17/2021 to evaluate for KHAI.  Telephone visit today is obtained to review the test results and to discuss plan of care.     Home sleep study report:  Date of study: 5/17/2021    Analysis Time: 479 minutes     Respiration:   Sleep Associated Hypoxemia: sustained hypoxemia was present.  Average oxygen saturation was 92.1%.  Time with saturation less than or equal to 88% was 20.5 minutes. The lowest oxygen saturation was 77%.   Snoring: Snoring was present, moderate to loud in intensity and intermittent.  Respiratory events: The home study revealed a presence of 30 obstructive apneas and 4 mixed and central apneas. There were 70 hypopneas resulting in a combined apnea/hypopnea index [AHI] of 13.0 events per hour.  AHI was 79.6 per hour supine, 0 per hour prone,  1.4 per hour on left side, and 0 per hour on right side.   Pattern: Excluding events noted above, respiratory rate and pattern was Normal.     Position: Percent of time spent: supine -15.7%, prone -8.3%, on left -35.9%, on right -40.1%.     Heart Rate: By pulse oximetry, normal rate was noted.      Assessment:     Mild obstructive sleep apnea, predominant during supine sleep position.    Sleep associated hypoxemia was present(oxygen desaturations were associated with the obstructive events during supine sleep).     The test results were discussed with the patient in detail.      Allergies:    No Known Allergies    Medications:    Current Outpatient Medications   Medication Sig Dispense Refill     amphetamine-dextroamphetamine (ADDERALL XR) 30 MG 24 hr capsule Take 1 capsule (30 mg) by mouth daily 30 capsule 0     amphetamine-dextroamphetamine (ADDERALL XR) 30 MG 24 hr capsule Take 1 capsule (30 mg) by mouth daily 30 capsule 0       Problem List:  Patient Active Problem List    Diagnosis Date Noted     Acute perforated appendicitis 07/07/2017     Priority: Medium     ACP (advance care planning) 07/05/2017     Priority: Medium     Advance Care Planning 7/5/2017: ACP Review of Chart / Resources Provided:  Reviewed chart for advance care plan.  Bassam MANUEL Jacobsen has no plan or code status on file. Discussed available resources and provided with information.   Added by Olga Baptiste             Attention deficit hyperactivity disorder (ADHD), combined type 07/31/2014     Priority: Medium     Diagnosed at [facility]- has been on meds  middle school-high school. Off meds last 3-4 year, was prescribed meds last by Lebron Evangelista at [facility]-confirmed with records    Patient is followed by [facility] for ongoing prescription of narcotic pain medicine.  Med: adderall.   Maximum use per month:   Expected duration:   Narcotic agreement on file: YES  Clinic visit recommended: Q 6  months              Baby premature 26-28 weeks 2014     Priority: Medium     PDA ligation needed       Health Care Home 2015     Priority: Low        Past Medical/Surgical History:  No past medical history on file.  Past Surgical History:   Procedure Laterality Date     ENT SURGERY       HEAD & NECK SURGERY       HERNIA REPAIR       LAPAROSCOPIC APPENDECTOMY N/A 2017    Procedure: LAPAROSCOPIC APPENDECTOMY;  LAPAROSCOPIC APPENDECTOMY;  Surgeon: Christiano Swain MD;  Location: [facility] OR      REPAIR PATENT DUCT ARTERIOSUS         Social History:  Social History     Socioeconomic History     Marital status:      Spouse name: Jeresy     Number of children: Not on file     Years of education: Not on file     Highest education level: Not on file   Occupational History     Occupation: metal work   Social Needs     Financial resource strain: Not on file     Food insecurity     Worry: Not on file     Inability: Not on file     Transportation needs     Medical: Not on file     Non-medical: Not on file   Tobacco Use     Smoking status: Former Smoker     Smokeless tobacco: Never Used     Tobacco comment: Very light smoker   Substance and Sexual Activity     Alcohol use: Yes     Alcohol/week: 1.7 standard drinks     Types: 2 Standard drinks or equivalent per week     Drug use: No     Sexual activity: Yes     Partners: Female     Birth control/protection: OCP   Lifestyle     Physical activity     Days per week: Not on file     Minutes per session: Not on file     Stress: Not on file   Relationships     Social connections     Talks on phone: Not on file     Gets together: Not on file     Attends Synagogue service: Not on file     Active member of club or organization: Not on file     Attends meetings of clubs or organizations: Not on file     Relationship status: Not on file     Intimate partner violence     Fear of current or ex partner: Not on file     Emotionally abused: Not on file     Physically abused: Not on file     " Forced sexual activity: Not on file   Other Topics Concern     Parent/sibling w/ CABG, MI or angioplasty before 65F 55M? Not Asked   Social History Narrative     Not on file       Family History:  Family History   Problem Relation Age of Onset     C.A.D. No family hx of      Diabetes No family hx of      Cerebrovascular Disease No family hx of      Coronary Artery Disease No family hx of      Colon Cancer No family hx of      Prostate Cancer No family hx of          Physical Examination:  Vitals: Ht 1.803 m (5' 11\")   Wt 90.7 kg (200 lb)   BMI 27.89 kg/m    BMI= Body mass index is 27.89 kg/m .    [place] Total Score 3/5/2021   Total score - [place] 7     General: No apparent distress  Chest: No cough, no audible wheezing, able to talk in full sentences  Psych: coherent speech, normal rate and volume, able to articulate logical thoughts, able   to abstract reason, no tangential thoughts, no hallucinations   or delusions  His affect is normal  Neuro:  Mental status: Alert and  Oriented X 3  Speech: normal     Impression/Plan:  Mild obstructive sleep apnea, predominant during supine sleep position. Sleep associated hypoxemia was present(oxygen desaturations were associated with the obstructive events during supine sleep).    We discussed the test results in detail and the various treatment options available for sleep apnea including: positional therapy during sleep dental appliance through referral to sleep dentistry and auto CPAP 5-15 cmH2O.    Patient was interested in pursuing positional therapy.  We discussed the various options for positional therapy including the FDA approved zzoma pillow  and other similar types of devices such as slumber bump, sleep noodle which can be purchased through online resources.  Prescription was provided for the FDA approved zzoma pillow if he is interested.  Patient was instructed to get back to us, if there is still concern for snoring in spite of using the positional therapy, in " "which case referral will be provided dentistry to obtain oral appliance.  He was instructed to call our clinic at [phone] after he obtains the positional device of his choice, in order to schedule a repeat home sleep study which will be done while he is using the positional therapy to check for the effectiveness of the  treatment of KHAI and he agreed.  Orders generated for future home sleep study in Psychiatric. Followup will be scheduled in approximately 2 weeks after he completes the sleep study to review the test results.     Overweight: We discussed weight management with healthy diet, and exercise.    Patient was strongly advised to avoid driving, operating any heavy machinery or other hazardous situations while drowsy or sleepy.  Patient was counseled on the importance of driving while alert, to pull over if drowsy, or nap before getting into the vehicle if sleepy.      CC: No ref. provider found      The above note was dictated using voice recognition software. Although reviewed after completion, some word and grammatical error may remain . Please contact the author for any clarifications.    \"I spent a total of 30 minutes  with Bassam Jacobsen during today's telephone visit, most  of this time was spent counseling the patient and  coordinating care regarding  KHAI, treatment options for KHAI, positional therapy options for KHAI, repeat HST with positional therapy,  weight management , going over sleep study , chart review  including documentation and further activities as noted above.\"      Tiara Vogt MD  [facility] [address] [address] [phone]  Dept: [phone]                "

## 2021-06-07 NOTE — PATIENT INSTRUCTIONS
Your BMI is Body mass index is 27.89 kg/m .  Weight management is a personal decision.  If you are interested in exploring weight loss strategies, the following discussion covers the approaches that may be successful. Body mass index (BMI) is one way to tell whether you are at a healthy weight, overweight, or obese. It measures your weight in relation to your height.  A BMI of 18.5 to 24.9 is in the healthy range. A person with a BMI of 25 to 29.9 is considered overweight, and someone with a BMI of 30 or greater is considered obese. More than two-thirds of American adults are considered overweight or obese.  Being overweight or obese increases the risk for further weight gain. Excess weight may lead to heart disease and diabetes.  Creating and following plans for healthy eating and physical activity may help you improve your health.  Weight control is part of healthy lifestyle and includes exercise, emotional health, and healthy eating habits. Careful eating habits lifelong are the mainstay of weight control. Though there are significant health benefits from weight loss, long-term weight loss with diet alone may be very difficult to achieve- studies show long-term success with dietary management in less than 10% of people. Attaining a healthy weight may be especially difficult to achieve in those with severe obesity. In some cases, medications, devices and surgical management might be considered.  What can you do?  If you are overweight or obese and are interested in methods for weight loss, you should discuss this with your provider.     Consider reducing daily calorie intake by 500 calories.     Keep a food journal.     Avoiding skipping meals, consider cutting portions instead.    Diet combined with exercise helps maintain muscle while optimizing fat loss. Strength training is particularly important for building and maintaining muscle mass. Exercise helps reduce stress, increase energy, and improves fitness.  Increasing exercise without diet control, however, may not burn enough calories to loose weight.       Start walking three days a week 10-20 minutes at a time    Work towards walking thirty minutes five days a week     Eventually, increase the speed of your walking for 1-2 minutes at time    In addition, we recommend that you review healthy lifestyles and methods for weight loss available through the National Institutes of Health patient information sites:  http://win.niddk.nih.gov/publications/index.htm    And look into health and wellness programs that may be available through your health insurance provider, employer, local community center, or denzel club.    Weight management plan: Patient was referred to their PCP to discuss a diet and exercise plan.    We discussed the various options for positional therapy including the FDA approved zzoma pillow  and other similar types of devices such as slumber bump, sleep noodle which can be purchased through online resources. Prescription has been provided for the FDA approved zzoma pillow if you are  Interested.    Please  get back to us, if there is still concern for snoring in spite of using the positional therapy, in which case referral will be provided dentistry to obtain dental appliance.    Please  call our clinic at [phone] after you obtain the positional device of your choice, in order to schedule a repeat home sleep study which will be done while you are using the positional device to check  the effectiveness of the  treatment of sleep apnea.   Followup will be scheduled in approximately 2 weeks after you complete the sleep study to review the test results.     Overweight: We discussed weight management with healthy diet, and exercise.    Patient was strongly advised to avoid driving, operating any heavy machinery or other hazardous situations while drowsy or sleepy.  Patient was counseled on the importance of driving while alert, to pull over if drowsy, or  nap before getting into the vehicle if sleepy.      CC: No ref. provider found

## 2021-10-14 ENCOUNTER — OFFICE VISIT (OUTPATIENT)
Dept: FAMILY MEDICINE | Facility: CLINIC | Age: 31
End: 2021-10-14

## 2021-10-14 VITALS
SYSTOLIC BLOOD PRESSURE: 116 MMHG | HEART RATE: 68 BPM | WEIGHT: 204.2 LBS | TEMPERATURE: 98.4 F | RESPIRATION RATE: 20 BRPM | DIASTOLIC BLOOD PRESSURE: 64 MMHG | HEIGHT: 71 IN | BODY MASS INDEX: 28.59 KG/M2

## 2021-10-14 DIAGNOSIS — Z79.899 CONTROLLED SUBSTANCE AGREEMENT SIGNED: ICD-10-CM

## 2021-10-14 DIAGNOSIS — G47.33 OSA (OBSTRUCTIVE SLEEP APNEA): ICD-10-CM

## 2021-10-14 DIAGNOSIS — F90.2 ATTENTION DEFICIT HYPERACTIVITY DISORDER (ADHD), COMBINED TYPE: Primary | ICD-10-CM

## 2021-10-14 PROCEDURE — 99213 OFFICE O/P EST LOW 20 MIN: CPT | Performed by: FAMILY MEDICINE

## 2021-10-14 RX ORDER — DEXTROAMPHETAMINE SACCHARATE, AMPHETAMINE ASPARTATE MONOHYDRATE, DEXTROAMPHETAMINE SULFATE AND AMPHETAMINE SULFATE 7.5; 7.5; 7.5; 7.5 MG/1; MG/1; MG/1; MG/1
30 CAPSULE, EXTENDED RELEASE ORAL DAILY
Qty: 30 CAPSULE | Refills: 0 | Status: SHIPPED | OUTPATIENT
Start: 2021-11-14 | End: 2021-12-14

## 2021-10-14 RX ORDER — DEXTROAMPHETAMINE SACCHARATE, AMPHETAMINE ASPARTATE MONOHYDRATE, DEXTROAMPHETAMINE SULFATE AND AMPHETAMINE SULFATE 7.5; 7.5; 7.5; 7.5 MG/1; MG/1; MG/1; MG/1
30 CAPSULE, EXTENDED RELEASE ORAL DAILY
Qty: 30 CAPSULE | Refills: 0 | Status: SHIPPED | OUTPATIENT
Start: 2021-12-15 | End: 2022-01-14

## 2021-10-14 RX ORDER — DEXTROAMPHETAMINE SACCHARATE, AMPHETAMINE ASPARTATE MONOHYDRATE, DEXTROAMPHETAMINE SULFATE AND AMPHETAMINE SULFATE 7.5; 7.5; 7.5; 7.5 MG/1; MG/1; MG/1; MG/1
30 CAPSULE, EXTENDED RELEASE ORAL DAILY
Qty: 30 CAPSULE | Refills: 0 | Status: SHIPPED | OUTPATIENT
Start: 2021-10-14 | End: 2021-11-13

## 2021-10-14 ASSESSMENT — MIFFLIN-ST. JEOR: SCORE: 1899.41

## 2021-10-14 NOTE — LETTER
[facility]          [address], [address]  [place], [place] [address]  [phone]  Fax [phone]    10/14/21    Patient: Bassam Jacobsen  YOB: 1990  Medical Record Number: [id number]                                                Controlled Substance Agreement  I understand that my care provider has prescribed controlled substances (narcotics, tranquilizers, and/or stimulants) to help manage my condition(s).  I am taking this medicine to help me function or work.  I know that this is strong medicine.  It could have serious side effects and even cause a dependency on the drug.  If I stop these medicines suddenly, I could have severe withdrawal symptoms.    The risks, benefits, and side effects of these medication(s) were explained to me.  I agree that:  1. I will take part in other treatments as advised by my provider.  This may be psychiatry or counseling, physical therapy, behavioral therapy, group treatment, or a referral to a pain clinic.  I will reduce or stop my medicine when my provider tells me to do so.   2. I will take my medicines as prescribed.  I will not change the dose or schedule unless my provider tells me to.  There will be no refills if I  run out early.   I may be contacted at any time without warning and asked to complete a drug test or pill count.   3. I will keep all my appointments at the clinic.  If I miss appointments or fail to follow instructions, my provider may stop my medicine.  4. I will not ask other providers to prescribe controlled substances. And I will not accept controlled substances from other people. If I need another prescribed controlled substance for a new reason, I will notify my provider within one business day.  5. If I enroll in the [place] Medical Marijuana program, I will tell my provider.  I will also sign an agreement to share my medical records with my provider.  6. I will use one pharmacy to fill all of my controlled  substance prescriptions.  If my prescription is mailed to my pharmacy, it may take 5 to 7 days for my medicine to be ready.  7. I understand that my provider, clinic care team, and pharmacy can track controlled substance prescriptions from other providers through a central database (prescription monitoring program).  8. I will bring in my list of medications (or my medicine bottles) each time I come to the clinic.  REV-  04/2016                                                                                                               Page  1 of 2    [facility]      10/14/21    Patient: Bassam Jacobsen  YOB: 1990  Medical Record Number: [id number]    9. Refills of controlled substances will be made only during office hours.  It is up to me to make sure that I do not run out of my medicines on weekends or holidays.    10. I am responsible for my prescriptions.  If the medicine is lost or stolen, it will not be replaced.   I also agree not to share these medicines with anyone.  11. I agree to not use ANY illegal or recreational drugs.  This includes marijuana, cocaine, bath salts or other drugs.  I agree not to use alcohol unless my provider says I may.  I agree to give urine samples whenever asked.  If I fail to give a urine sample, the provider may stop my medicine.     12. I will tell my nurse or provider right away if I become pregnant or have a new medical problem treated outside of [facility].  13. I understand that this medicine can affect my thinking and judgment.  It may be unsafe for me to drive, use machinery and do dangerous tasks.  I will not do any of these things until I know how the medicine affects me.  If my dose changes, I will wait to see how it affects me.  I will contact my provider if I have concerns about medicine side effects.  I understand that if I do not follow any of the conditions above, my prescriptions or treatment may be stopped.    I agree that my  provider, clinic care team, and pharmacy may work with any city, state or federal law enforcement agency that investigates the misuse, sale, or other diversion of my controlled medicine. I will allow my provider to discuss my care with or share a copy of this agreement with any other treating provider, pharmacy or emergency room where I receive care.  I agree to give up (waive) any right of privacy or confidentiality with respect to these authorizations.   I have read this agreement and have asked questions about anything I did not understand.   ___________________________________    ___________________________  Patient Signature                                                           Date and Time  ___________________________________     ____________________________  Witness                                                                            Date and Time  ___________________________________  Morgan Valdez MD  REV-  04/2016                                                                                                                                                                 Page 2 of 2

## 2021-10-14 NOTE — PROGRESS NOTES
"ADHD Follow Up   SUBJECTIVE:     Are your symptoms controlled? yes  Are you satisfied with your current medication dosage? yes  Are you taking your medication regularly? yes  Are you experiencing any insomnia? no  Are you experiencing any jitteriness? no  Are you experiencing any loss of appetite or weight loss? no  Are you experiencing any other side effects? no  ROS:   CONSTITUTIONAL:NEGATIVE   RESP: NEGATIVE   CV: NEGATIVE   NEURO: NEGATIVE  OBJECTIVE:  /64 (BP Location: Right arm, Patient Position: Chair, Cuff Size: Adult Regular)   Pulse 68   Temp 98.4  F (36.9  C)   Resp 20   Ht 1.797 m (5' 10.75\")   Wt 92.6 kg (204 lb 3.2 oz)   BMI 28.68 kg/m    S1 and S2 normal, no murmurs, clicks, gallops or rubs. Regular rate and rhythm. Chest is clear; no wheezes or rales. No edema or JVD.     ASSESSMENT:  Per encounter diagnoses.    PLAN:  Per orders.    (F90.2) Attention deficit hyperactivity disorder (ADHD), combined type  (primary encounter diagnosis)  Comment: well controlled, PDMP reviewed  Plan: continue current medications at current doses     (G47.33) KHAI (obstructive sleep apnea)  Comment: working through sleep clinic  Plan:    "

## 2021-10-24 ENCOUNTER — HEALTH MAINTENANCE LETTER (OUTPATIENT)
Age: 31
End: 2021-10-24

## 2021-10-27 ENCOUNTER — TELEPHONE (OUTPATIENT)
Dept: FAMILY MEDICINE | Facility: CLINIC | Age: 31
End: 2021-10-27

## 2021-10-27 DIAGNOSIS — F90.2 ATTENTION DEFICIT HYPERACTIVITY DISORDER (ADHD), COMBINED TYPE: Primary | ICD-10-CM

## 2021-10-27 RX ORDER — DEXTROAMPHETAMINE SACCHARATE, AMPHETAMINE ASPARTATE MONOHYDRATE, DEXTROAMPHETAMINE SULFATE AND AMPHETAMINE SULFATE 7.5; 7.5; 7.5; 7.5 MG/1; MG/1; MG/1; MG/1
30 CAPSULE, EXTENDED RELEASE ORAL DAILY
Qty: 30 CAPSULE | Refills: 0 | Status: SHIPPED | OUTPATIENT
Start: 2021-10-27 | End: 2021-11-26

## 2021-10-27 RX ORDER — DEXTROAMPHETAMINE SACCHARATE, AMPHETAMINE ASPARTATE MONOHYDRATE, DEXTROAMPHETAMINE SULFATE AND AMPHETAMINE SULFATE 7.5; 7.5; 7.5; 7.5 MG/1; MG/1; MG/1; MG/1
30 CAPSULE, EXTENDED RELEASE ORAL DAILY
Qty: 30 CAPSULE | Refills: 0 | Status: SHIPPED | OUTPATIENT
Start: 2021-12-28 | End: 2022-01-27

## 2021-10-27 RX ORDER — DEXTROAMPHETAMINE SACCHARATE, AMPHETAMINE ASPARTATE MONOHYDRATE, DEXTROAMPHETAMINE SULFATE AND AMPHETAMINE SULFATE 7.5; 7.5; 7.5; 7.5 MG/1; MG/1; MG/1; MG/1
30 CAPSULE, EXTENDED RELEASE ORAL DAILY
Qty: 30 CAPSULE | Refills: 0 | Status: SHIPPED | OUTPATIENT
Start: 2021-11-27 | End: 2021-12-27

## 2021-10-27 NOTE — TELEPHONE ENCOUNTER
Patient left a message stating his insurance is not accepted at [facility] any longer. He would like Dr Valdez to send his adderall prescriptions to the [facility] in [place].  Routing to Dr Valdez, please advise

## 2021-10-28 NOTE — TELEPHONE ENCOUNTER
Pt left a voicemail stating that his insurance only covers Brand Name.  I left patient a VM to inform him that pharmacy needs to run under the brand name and not generic.  The script states both and they can run it under brand name.      Pt phone # [phone]

## 2021-12-19 ENCOUNTER — HEALTH MAINTENANCE LETTER (OUTPATIENT)
Age: 31
End: 2021-12-19

## 2022-01-27 DIAGNOSIS — F90.2 ATTENTION DEFICIT HYPERACTIVITY DISORDER (ADHD), COMBINED TYPE: ICD-10-CM

## 2022-01-27 RX ORDER — DEXTROAMPHETAMINE SACCHARATE, AMPHETAMINE ASPARTATE MONOHYDRATE, DEXTROAMPHETAMINE SULFATE AND AMPHETAMINE SULFATE 7.5; 7.5; 7.5; 7.5 MG/1; MG/1; MG/1; MG/1
30 CAPSULE, EXTENDED RELEASE ORAL DAILY
Qty: 30 CAPSULE | Refills: 0 | Status: SHIPPED | OUTPATIENT
Start: 2022-03-30 | End: 2022-04-29

## 2022-01-27 RX ORDER — DEXTROAMPHETAMINE SACCHARATE, AMPHETAMINE ASPARTATE MONOHYDRATE, DEXTROAMPHETAMINE SULFATE AND AMPHETAMINE SULFATE 7.5; 7.5; 7.5; 7.5 MG/1; MG/1; MG/1; MG/1
30 CAPSULE, EXTENDED RELEASE ORAL DAILY
Qty: 30 CAPSULE | Refills: 0 | Status: SHIPPED | OUTPATIENT
Start: 2022-01-27 | End: 2022-02-26

## 2022-01-27 RX ORDER — DEXTROAMPHETAMINE SACCHARATE, AMPHETAMINE ASPARTATE MONOHYDRATE, DEXTROAMPHETAMINE SULFATE AND AMPHETAMINE SULFATE 7.5; 7.5; 7.5; 7.5 MG/1; MG/1; MG/1; MG/1
30 CAPSULE, EXTENDED RELEASE ORAL DAILY
Qty: 30 CAPSULE | Refills: 0 | Status: SHIPPED | OUTPATIENT
Start: 2022-02-27 | End: 2022-03-29

## 2022-01-27 NOTE — TELEPHONE ENCOUNTER
Bassam called requesting the next three months worth of Adderall XR. Last seen in October 2021.      Pending Prescriptions:                       Disp   Refills    amphetamine-dextroamphetamine (ADDERALL X*30 cap*0            Sig: Take 1 capsule (30 mg) by mouth daily    amphetamine-dextroamphetamine (ADDERALL X*30 cap*0            Sig: Take 1 capsule (30 mg) by mouth daily    amphetamine-dextroamphetamine (ADDERALL X*30 cap*0            Sig: Take 1 capsule (30 mg) by mouth daily

## 2022-02-13 ENCOUNTER — HEALTH MAINTENANCE LETTER (OUTPATIENT)
Age: 32
End: 2022-02-13

## 2022-06-06 ENCOUNTER — OFFICE VISIT (OUTPATIENT)
Dept: FAMILY MEDICINE | Facility: CLINIC | Age: 32
End: 2022-06-06

## 2022-06-06 VITALS
RESPIRATION RATE: 20 BRPM | DIASTOLIC BLOOD PRESSURE: 60 MMHG | SYSTOLIC BLOOD PRESSURE: 94 MMHG | BODY MASS INDEX: 26.07 KG/M2 | HEART RATE: 72 BPM | WEIGHT: 186.2 LBS | HEIGHT: 71 IN | TEMPERATURE: 97.2 F

## 2022-06-06 DIAGNOSIS — Z79.899 CONTROLLED SUBSTANCE AGREEMENT SIGNED: ICD-10-CM

## 2022-06-06 DIAGNOSIS — G47.33 OSA (OBSTRUCTIVE SLEEP APNEA): ICD-10-CM

## 2022-06-06 DIAGNOSIS — Z71.89 ACP (ADVANCE CARE PLANNING): ICD-10-CM

## 2022-06-06 DIAGNOSIS — F90.2 ATTENTION DEFICIT HYPERACTIVITY DISORDER (ADHD), COMBINED TYPE: Primary | ICD-10-CM

## 2022-06-06 PROCEDURE — 99213 OFFICE O/P EST LOW 20 MIN: CPT | Performed by: FAMILY MEDICINE

## 2022-06-06 RX ORDER — DEXTROAMPHETAMINE SACCHARATE, AMPHETAMINE ASPARTATE MONOHYDRATE, DEXTROAMPHETAMINE SULFATE AND AMPHETAMINE SULFATE 7.5; 7.5; 7.5; 7.5 MG/1; MG/1; MG/1; MG/1
30 CAPSULE, EXTENDED RELEASE ORAL DAILY
Qty: 30 CAPSULE | Refills: 0 | Status: SHIPPED | OUTPATIENT
Start: 2022-06-06 | End: 2022-07-06

## 2022-06-06 RX ORDER — DEXTROAMPHETAMINE SACCHARATE, AMPHETAMINE ASPARTATE MONOHYDRATE, DEXTROAMPHETAMINE SULFATE AND AMPHETAMINE SULFATE 7.5; 7.5; 7.5; 7.5 MG/1; MG/1; MG/1; MG/1
30 CAPSULE, EXTENDED RELEASE ORAL DAILY
Qty: 30 CAPSULE | Refills: 0 | Status: SHIPPED | OUTPATIENT
Start: 2022-08-07 | End: 2022-09-06

## 2022-06-06 RX ORDER — DEXTROAMPHETAMINE SACCHARATE, AMPHETAMINE ASPARTATE MONOHYDRATE, DEXTROAMPHETAMINE SULFATE AND AMPHETAMINE SULFATE 7.5; 7.5; 7.5; 7.5 MG/1; MG/1; MG/1; MG/1
30 CAPSULE, EXTENDED RELEASE ORAL DAILY
Qty: 30 CAPSULE | Refills: 0 | Status: SHIPPED | OUTPATIENT
Start: 2022-07-07 | End: 2022-08-06

## 2022-06-06 NOTE — PROGRESS NOTES
"ADHD Follow Up   SUBJECTIVE:     Work going well, pt going through divorce    Are your symptoms controlled? yes  Are you satisfied with your current medication dosage? yes  Are you taking your medication regularly? yes  Are you experiencing any insomnia? no  Are you experiencing any jitteriness? no  Are you experiencing any loss of appetite or weight loss? no  Are you experiencing any other side effects? no  ROS:   CONSTITUTIONAL:NEGATIVE   RESP: NEGATIVE   CV: NEGATIVE   NEURO: NEGATIVE  OBJECTIVE:  BP 94/60 (BP Location: Right arm, Patient Position: Chair, Cuff Size: Adult Regular)   Pulse 72   Temp 97.2  F (36.2  C) (Temporal)   Resp 20   Ht 1.797 m (5' 10.75\")   Wt 84.5 kg (186 lb 3.2 oz)   BMI 26.15 kg/m    S1 and S2 normal, no murmurs, clicks, gallops or rubs. Regular rate and rhythm. Chest is clear; no wheezes or rales. No edema or JVD.     ASSESSMENT:  Per encounter diagnoses.    PLAN:  Per orders.    (F90.2) Attention deficit hyperactivity disorder (ADHD), combined type  (primary encounter diagnosis)  Comment: well con trolled  Plan: continue current medications at current doses     (Z79.899) Controlled substance agreement signed-10/14/21  Comment:   Plan:     (G47.33) KHAI (obstructive sleep apnea)  Comment: did not complete testing-feels he does not need  Plan: f/u if not improving or worsening    "

## 2022-06-06 NOTE — NURSING NOTE
Bassam Jacobsen is here for a medication check and refill.    Questioned patient about current smoking habits.  Pt. quit smoking some time ago.  PULSE regular  My Chart: active  CLASSIFICATION OF OVERWEIGHT AND OBESITY BY BMI                        Obesity Class           BMI(kg/m2)  Underweight                                    < 18.5  Normal                                         18.5-24.9  Overweight                                     25.0-29.9  OBESITY                     I                  30.0-34.9                             II                 35.0-39.9  EXTREME OBESITY             III                >40                            Patient's  BMI Body mass index is 26.15 kg/m .  http://hin.nhlbi.nih.gov/menuplanner/menu.cgi  Pre-visit planning  Immunizations - up to date  Colonoscopy -   Mammogram -   Asthma -   PHQ9 -    GRAHAM-7 -

## 2022-07-18 ENCOUNTER — OFFICE VISIT (OUTPATIENT)
Dept: FAMILY MEDICINE | Facility: CLINIC | Age: 32
End: 2022-07-18

## 2022-07-18 VITALS
HEART RATE: 78 BPM | HEIGHT: 71 IN | DIASTOLIC BLOOD PRESSURE: 60 MMHG | OXYGEN SATURATION: 96 % | BODY MASS INDEX: 25.2 KG/M2 | TEMPERATURE: 97.5 F | SYSTOLIC BLOOD PRESSURE: 118 MMHG | WEIGHT: 180 LBS

## 2022-07-18 DIAGNOSIS — Z11.3 ROUTINE SCREENING FOR STI (SEXUALLY TRANSMITTED INFECTION): Primary | ICD-10-CM

## 2022-07-18 DIAGNOSIS — G47.33 OSA (OBSTRUCTIVE SLEEP APNEA): ICD-10-CM

## 2022-07-18 PROCEDURE — 87389 HIV-1 AG W/HIV-1&-2 AB AG IA: CPT | Mod: 90 | Performed by: FAMILY MEDICINE

## 2022-07-18 PROCEDURE — 87491 CHLMYD TRACH DNA AMP PROBE: CPT | Mod: 90 | Performed by: FAMILY MEDICINE

## 2022-07-18 PROCEDURE — 86592 SYPHILIS TEST NON-TREP QUAL: CPT | Mod: 90 | Performed by: FAMILY MEDICINE

## 2022-07-18 PROCEDURE — 87591 N.GONORRHOEAE DNA AMP PROB: CPT | Mod: 90 | Performed by: FAMILY MEDICINE

## 2022-07-18 PROCEDURE — 36415 COLL VENOUS BLD VENIPUNCTURE: CPT | Performed by: FAMILY MEDICINE

## 2022-07-18 PROCEDURE — 99213 OFFICE O/P EST LOW 20 MIN: CPT | Performed by: FAMILY MEDICINE

## 2022-07-18 NOTE — PROGRESS NOTES
"SUBJECTIVE:  Bassam Jacobsen, a 32 year old male scheduled an appointment to discuss the following issues:     Routine screening for STI (sexually transmitted infection)  KHAI (obstructive sleep apnea)     Pt going through divorce-has had \"a couple\" sexual partners in last 3 months-did not use protection every time    Pt denies any symptoms but would like screening    Medical, social, surgical, and family histories reviewed.    ROS:  CONSTITUTIONAL: NEGATIVE for fever, chills  EYES: NEGATIVE for vision changes   RESP: NEGATIVE for significant cough or SOB  CV: NEGATIVE for chest pain, palpitations   GI: NEGATIVE for nausea, abdominal pain, heartburn, or change in bowel habits  : NEGATIVE for frequency, dysuria, or hematuria  MUSCULOSKELETAL: NEGATIVE for significant arthralgias or myalgia  NEURO: NEGATIVE for weakness, dizziness or paresthesias or headache    OBJECTIVE:  /60 (BP Location: Left arm, Patient Position: Sitting, Cuff Size: Adult Large)   Pulse 78   Temp 97.5  F (36.4  C) (Temporal)   Ht 1.797 m (5' 10.75\")   Wt 81.6 kg (180 lb)   SpO2 96%   BMI 25.28 kg/m    EXAM:  GENERAL APPEARANCE: healthy, alert and no distress  RESP: lungs clear to auscultation - no rales, rhonchi or wheezes  CV: regular rates and rhythm, normal S1 S2, no S3 or S4 and no murmur, click or rub -  ABDOMEN:  soft, nontender, no HSM or masses and bowel sounds normal    ASSESSMENT/PLAN:  (Z11.3) Routine screening for STI (sexually transmitted infection)  (primary encounter diagnosis)  Comment: The patient was concerned about STD infections and asked that we check for these infections. We discussed safe sexual practices including barrier protection to avoid STD transmission.   Plan:     (G47.33) KHAI (obstructive sleep apnea)  Comment: never saw sleep last year as planned  Plan: Adult Sleep Eval & Management          Referral             "

## 2022-07-19 LAB
CHLAMYDIA TRACHOMATIS RNA, TMA - QUEST: NOT DETECTED
HIV 1/2 AGN ABY 4TH GEN WITH REFLEX: NORMAL
NEISSERIA GONORRHOEAE RNA TMA: NOT DETECTED
RPR SCREEN - QUEST: NORMAL
SEE NOTE - QUEST: NORMAL

## 2022-10-16 ENCOUNTER — HEALTH MAINTENANCE LETTER (OUTPATIENT)
Age: 32
End: 2022-10-16

## 2022-10-27 ENCOUNTER — OFFICE VISIT (OUTPATIENT)
Dept: FAMILY MEDICINE | Facility: CLINIC | Age: 32
End: 2022-10-27

## 2022-10-27 VITALS
DIASTOLIC BLOOD PRESSURE: 62 MMHG | TEMPERATURE: 97.3 F | HEART RATE: 68 BPM | WEIGHT: 174.2 LBS | BODY MASS INDEX: 24.39 KG/M2 | SYSTOLIC BLOOD PRESSURE: 110 MMHG | HEIGHT: 71 IN | RESPIRATION RATE: 20 BRPM

## 2022-10-27 DIAGNOSIS — F90.2 ATTENTION DEFICIT HYPERACTIVITY DISORDER (ADHD), COMBINED TYPE: Primary | ICD-10-CM

## 2022-10-27 DIAGNOSIS — Z79.899 CONTROLLED SUBSTANCE AGREEMENT SIGNED: ICD-10-CM

## 2022-10-27 DIAGNOSIS — G47.33 OSA (OBSTRUCTIVE SLEEP APNEA): ICD-10-CM

## 2022-10-27 PROCEDURE — 99213 OFFICE O/P EST LOW 20 MIN: CPT | Performed by: FAMILY MEDICINE

## 2022-10-27 RX ORDER — DEXTROAMPHETAMINE SACCHARATE, AMPHETAMINE ASPARTATE MONOHYDRATE, DEXTROAMPHETAMINE SULFATE AND AMPHETAMINE SULFATE 7.5; 7.5; 7.5; 7.5 MG/1; MG/1; MG/1; MG/1
30 CAPSULE, EXTENDED RELEASE ORAL DAILY
Qty: 30 CAPSULE | Refills: 0 | Status: SHIPPED | OUTPATIENT
Start: 2022-10-27 | End: 2022-11-26

## 2022-10-27 RX ORDER — DEXTROAMPHETAMINE SACCHARATE, AMPHETAMINE ASPARTATE MONOHYDRATE, DEXTROAMPHETAMINE SULFATE AND AMPHETAMINE SULFATE 7.5; 7.5; 7.5; 7.5 MG/1; MG/1; MG/1; MG/1
30 CAPSULE, EXTENDED RELEASE ORAL DAILY
Qty: 30 CAPSULE | Refills: 0 | Status: SHIPPED | OUTPATIENT
Start: 2022-12-28 | End: 2023-01-27

## 2022-10-27 RX ORDER — DEXTROAMPHETAMINE SACCHARATE, AMPHETAMINE ASPARTATE MONOHYDRATE, DEXTROAMPHETAMINE SULFATE AND AMPHETAMINE SULFATE 7.5; 7.5; 7.5; 7.5 MG/1; MG/1; MG/1; MG/1
30 CAPSULE, EXTENDED RELEASE ORAL DAILY
Qty: 30 CAPSULE | Refills: 0 | Status: SHIPPED | OUTPATIENT
Start: 2022-11-27 | End: 2022-12-27

## 2022-10-27 NOTE — NURSING NOTE
Bassam Jacobsen is here for a medication check and refill.  Questioned patient about current smoking habits.  Pt. quit smoking some time ago.  PULSE regular  My Chart: active  CLASSIFICATION OF OVERWEIGHT AND OBESITY BY BMI                        Obesity Class           BMI(kg/m2)  Underweight                                    < 18.5  Normal                                         18.5-24.9  Overweight                                     25.0-29.9  OBESITY                     I                  30.0-34.9                             II                 35.0-39.9  EXTREME OBESITY             III                >40                            Patient's  BMI Body mass index is 24.47 kg/m .  http://hin.nhlbi.nih.gov/menuplanner/menu.cgi  Pre-visit planning  Immunizations - up to date  Colonoscopy -   Mammogram -   Asthma -   PHQ9 -    GRAHAM-7 -      The patient has verbalized that it is ok to leave a detailed voice message on the patient's cell phone with results/recommendations from this visit.

## 2022-10-27 NOTE — PROGRESS NOTES
"ADHD Follow Up   SUBJECTIVE:     Are your symptoms controlled? Pretty well  Are you satisfied with your current medication dosage? yes  Are you taking your medication regularly? yes  Are you experiencing any insomnia? no  Are you experiencing any jitteriness? no  Are you experiencing any loss of appetite or weight loss? no  Are you experiencing any other side effects? No    Pt still needs appt for KHAI  ROS:   CONSTITUTIONAL:NEGATIVE   RESP: NEGATIVE   CV: NEGATIVE   NEURO: NEGATIVE  OBJECTIVE:  /62 (BP Location: Right arm, Patient Position: Chair, Cuff Size: Adult Regular)   Pulse 68   Temp 97.3  F (36.3  C) (Temporal)   Resp 20   Ht 1.797 m (5' 10.75\")   Wt 79 kg (174 lb 3.2 oz)   BMI 24.47 kg/m    S1 and S2 normal, no murmurs, clicks, gallops or rubs. Regular rate and rhythm. Chest is clear; no wheezes or rales. No edema or JVD.     ASSESSMENT:  Per encounter diagnoses.    PLAN:  Per orders.    (F90.2) Attention deficit hyperactivity disorder (ADHD), combined type  (primary encounter diagnosis)  Comment: well controlled  Plan: continue current medications at current doses     (Z79.899) Controlled substance agreement signed-10/14/21  Comment:   Plan:     (G47.33) KHAI (obstructive sleep apnea)  Comment: awaiting appt  Plan:    "

## 2022-10-27 NOTE — LETTER
[facility]          [address], [address]  [place], [place] [address]  [phone]  Fax [phone]    10/27/22    Patient: Bassam Jacobsen  YOB: 1990  Medical Record Number: [id number]                                                Controlled Substance Agreement  I understand that my care provider has prescribed controlled substances (narcotics, tranquilizers, and/or stimulants) to help manage my condition(s).  I am taking this medicine to help me function or work.  I know that this is strong medicine.  It could have serious side effects and even cause a dependency on the drug.  If I stop these medicines suddenly, I could have severe withdrawal symptoms.    The risks, benefits, and side effects of these medication(s) were explained to me.  I agree that:  1. I will take part in other treatments as advised by my provider.  This may be psychiatry or counseling, physical therapy, behavioral therapy, group treatment, or a referral to a pain clinic.  I will reduce or stop my medicine when my provider tells me to do so.   2. I will take my medicines as prescribed.  I will not change the dose or schedule unless my provider tells me to.  There will be no refills if I  run out early.   I may be contacted at any time without warning and asked to complete a drug test or pill count.   3. I will keep all my appointments at the clinic.  If I miss appointments or fail to follow instructions, my provider may stop my medicine.  4. I will not ask other providers to prescribe controlled substances. And I will not accept controlled substances from other people. If I need another prescribed controlled substance for a new reason, I will notify my provider within one business day.  5. If I enroll in the [place] Medical Marijuana program, I will tell my provider.  I will also sign an agreement to share my medical records with my provider.  6. I will use one pharmacy to fill all of my controlled  substance prescriptions.  If my prescription is mailed to my pharmacy, it may take 5 to 7 days for my medicine to be ready.  7. I understand that my provider, clinic care team, and pharmacy can track controlled substance prescriptions from other providers through a central database (prescription monitoring program).  8. I will bring in my list of medications (or my medicine bottles) each time I come to the clinic.  REV-  04/2016                                                                                                               Page  1 of 2    [facility]      10/27/22    Patient: Bassam Jacobsen  YOB: 1990  Medical Record Number: [id number]    9. Refills of controlled substances will be made only during office hours.  It is up to me to make sure that I do not run out of my medicines on weekends or holidays.    10. I am responsible for my prescriptions.  If the medicine is lost or stolen, it will not be replaced.   I also agree not to share these medicines with anyone.  11. I agree to not use ANY illegal or recreational drugs.  This includes marijuana, cocaine, bath salts or other drugs.  I agree not to use alcohol unless my provider says I may.  I agree to give urine samples whenever asked.  If I fail to give a urine sample, the provider may stop my medicine.     12. I will tell my nurse or provider right away if I become pregnant or have a new medical problem treated outside of [facility].  13. I understand that this medicine can affect my thinking and judgment.  It may be unsafe for me to drive, use machinery and do dangerous tasks.  I will not do any of these things until I know how the medicine affects me.  If my dose changes, I will wait to see how it affects me.  I will contact my provider if I have concerns about medicine side effects.  I understand that if I do not follow any of the conditions above, my prescriptions or treatment may be stopped.    I agree that my  provider, clinic care team, and pharmacy may work with any city, state or federal law enforcement agency that investigates the misuse, sale, or other diversion of my controlled medicine. I will allow my provider to discuss my care with or share a copy of this agreement with any other treating provider, pharmacy or emergency room where I receive care.  I agree to give up (waive) any right of privacy or confidentiality with respect to these authorizations.   I have read this agreement and have asked questions about anything I did not understand.   ___________________________________    ___________________________  Patient Signature                                                           Date and Time  ___________________________________     ____________________________  Witness                                                                            Date and Time  ___________________________________  Morgan Valdez MD  REV-  04/2016                                                                                                                                                                 Page 2 of 2

## 2023-01-26 ENCOUNTER — OFFICE VISIT (OUTPATIENT)
Dept: SLEEP MEDICINE | Facility: CLINIC | Age: 33
End: 2023-01-26
Attending: FAMILY MEDICINE
Payer: COMMERCIAL

## 2023-01-26 ENCOUNTER — TELEPHONE (OUTPATIENT)
Dept: SLEEP MEDICINE | Facility: CLINIC | Age: 33
End: 2023-01-26

## 2023-01-26 VITALS
HEIGHT: 72 IN | OXYGEN SATURATION: 98 % | DIASTOLIC BLOOD PRESSURE: 60 MMHG | WEIGHT: 180 LBS | HEART RATE: 66 BPM | BODY MASS INDEX: 24.38 KG/M2 | SYSTOLIC BLOOD PRESSURE: 97 MMHG

## 2023-01-26 DIAGNOSIS — G47.33 OSA (OBSTRUCTIVE SLEEP APNEA): Primary | ICD-10-CM

## 2023-01-26 PROCEDURE — 99214 OFFICE O/P EST MOD 30 MIN: CPT | Performed by: INTERNAL MEDICINE

## 2023-01-26 NOTE — PATIENT INSTRUCTIONS
"      MY TREATMENT INFORMATION FOR SLEEP APNEA-  Bassam Jacobsen    DOCTOR : Kain Brock MD  Frequently asked questions:  1. What is Obstructive Sleep Apnea (KHAI)? KHAI is the most common type of sleep apnea. Apnea means, \"without breath.\"  Apnea is most often caused by narrowing or collapse of the upper airway as muscles relax during sleep.   Almost everyone has occasional apneas. Most people with sleep apnea have had brief interruptions at night frequently for many years.  The severity of sleep apnea is related to how frequent and severe the events are.   2. What are the consequences of KHAI? Symptoms include: feeling sleepy during the day, snoring loudly, gasping or stopping of breathing, trouble sleeping, and occasionally morning headaches or heartburn at night.  Sleepiness can be serious and even increase the risk of falling asleep while driving. Other health consequences may include development of high blood pressure and other cardiovascular disease in persons who are susceptible. Untreated KHAI  can contribute to heart disease, stroke and diabetes.   3. What are the treatment options? In most situations, sleep apnea is a lifelong disease that must be managed with daily therapy. Medications are not effective for sleep apnea and surgery is generally not considered until other therapies have been tried. Your treatment is your choice . Continuous Positive Airway (CPAP) works right away and is the therapy that is effective in nearly everyone. An oral device to hold your jaw forward is usually the next most reliable option. Other options include postioning devices (to keep you off your back), weight loss, and surgery including a tongue pacing device. There is more detail about some of these options below.  4. Are my sleep studies covered by insurance? Although we will request verification of coverage, we advise you also check in advance of the study to ensure there is coverage.    Important tips for those choosing " CPAP and similar devices   Know your equipment:  CPAP is continuous positive airway pressure that prevents obstructive sleep apnea by keeping the throat from collapsing while you are sleeping. In most cases, the device is  smart  and can slowly self-adjusts if your throat collapses and keeps a record every day of how well you are treated-this information is available to you and your care team.  BPAP is bilevel positive airway pressure that keeps your throat open and also assists each breath with a pressure boost to maintain adequate breathing.  Special kinds of BPAP are used in patients who have inadequate breathing from lung or heart disease. In most cases, the device is  smart  and can slowly self-adjusts to assist breathing. Like CPAP, the device keeps a record of how well you are treated.  Your mask is your connection to the device. You get to choose what feels most comfortable and the staff will help to make sure if fits. Here: are some examples of the different masks that are available:       Key points to remember on your journey with sleep apnea:  1. Sleep study.  PAP devices often need to be adjusted during a sleep study to show that they are effective and adjusted right.  2. Good tips to remember: Try wearing just the mask during a quiet time during the day so your body adapts to wearing it. A humidifier is recommended for comfort in most cases to prevent drying of your nose and throat. Allergy medication from your provider may help you if you are having nasal congestion.  3. Getting settled-in. It takes more than one night for most of us to get used to wearing a mask. Try wearing just the mask during a quiet time during the day so your body adapts to wearing it. A humidifier is recommended for comfort in most cases. Our team will work with you carefully on the first day and will be in contact within 4 days and again at 2 and 4 weeks for advice and remote device adjustments. Your therapy is evaluated by  the device each day.   4. Use it every night. The more you are able to sleep naturally for 7-8 hours, the more likely you will have good sleep and to prevent health risks or symptoms from sleep apnea. Even if you use it 4 hours it helps. Occasionally all of us are unable to use a medical therapy, in sleep apnea, it is not dangerous to miss one night.   5. Communicate. Call our skilled team on the number provided on the first day if your visit for problems that make it difficult to wear the device. Over 2 out of 3 patients can learn to wear the device long-term with help from our team. Remember to call our team or your sleep providers if you are unable to wear the device as we may have other solutions for those who cannot adapt to mask CPAP therapy. It is recommended that you sleep your sleep provider within the first 3 months and yearly after that if you are not having problems.   6. Use it for your health. We encourage use of CPAP masks during daytime quiet periods to allow your face and brain to adapt to the sensation of CPAP so that it will be a more natural sensation to awaken to at night or during naps. This can be very useful during the first few weeks or months of adapting to CPAP though it does not help medically to wear CPAP during wakefulness and  should not be used as a strategy just to meet guidelines.  7. Take care of your equipment. Make sure you clean your mask and tubing using directions every day and that your filter and mask are replaced as recommended or if they are not working.     BESIDES CPAP, WHAT OTHER THERAPIES ARE THERE?    Positioning Device  Positioning devices are generally used when sleep apnea is mild and only occurs on your back.This example shows a pillow that straps around the waist. It may be appropriate for those whose sleep study shows milder sleep apnea that occurs primarily when lying flat on one's back. Preliminary studies have shown benefit but effectiveness at home may need  to be verified by a home sleep test. These devices are generally not covered by medical insurance.  Examples of devices that maintain sleeping on the back to prevent snoring and mild sleep apnea.    Belt type body positioner  [email or web address]/    Electronic reminder  http://nightshifttherapy.com/            Oral Appliance  What is oral appliance therapy?  An oral appliance device fits on your teeth at night like a retainer used after having braces. The device is made by a specialized dentist and requires several visits over 1-2 months before a manufactured device is made to fit your teeth and is adjusted to prevent your sleep apnea. Once an oral device is working properly, snoring should be improved. A home sleep test may be recommended at that time if to determine whether the sleep apnea is adequately treated.       Some things to remember:  -Oral devices are often, but not always, covered by your medical insurance. Be sure to check with your insurance provider.   -If you are referred for oral therapy, you will be given a list of specialized dentists to consider or you may choose to visit the Web site of the American Academy of Dental Sleep Medicine  -Oral devices are less likely to work if you have severe sleep apnea or are extremely overweight.     More detailed information  An oral appliance is a small acrylic device that fits over the upper and lower teeth  (similar to a retainer or a mouth guard). This device slightly moves jaw forward, which moves the base of the tongue forward, opens the airway, improves breathing for effective treat snoring and obstructive sleep apnea in perhaps 7 out of 10 people .  The best working devices are custom-made by a dental device  after a mold is made of the teeth 1, 2, 3.  When is an oral appliance indicated?  Oral appliance therapy is recommended as a first-line treatment for patients with primary snoring, mild sleep apnea, and for patients with moderate sleep  apnea who prefer appliance therapy to use of CPAP4, 5. Severity of sleep apnea is determined by sleep testing and is based on the number of respiratory events per hour of sleep.   How successful is oral appliance therapy?  The success rate of oral appliance therapy in patients with mild sleep apnea is 75-80% while in patients with moderate sleep apnea it is 50-70%. The chance of success in patients with severe sleep apnea is 40-50%. The research also shows that oral appliances have a beneficial effect on the cardiovascular health of KHAI patients at the same magnitude as CPAP therapy7.  Oral appliances should be a second-line treatment in cases of severe sleep apnea, but if not completely successful then a combination therapy utilizing CPAP plus oral appliance therapy may be effective. Oral appliances tend to be effective in a broad range of patients although studies show that the patients who have the highest success are females, younger patients, those with milder disease, and less severe obesity. 3, 6.   Finding a dentist that practices dental sleep medicine  Specific training is available through the American Academy of Dental Sleep Medicine for dentists interested in working in the field of sleep. To find a dentist who is educated in the field of sleep and the use of oral appliances, near you, visit the Web site of the American Academy of Dental Sleep Medicine.    References  1. Whitley, et al. Objectively measured vs self-reported compliance during oral appliance therapy for sleep-disordered breathing. Chest 2013; 144(5): 6596-4046.  2. Robe, et al. Objective measurement of compliance during oral appliance therapy for sleep-disordered breathing. Thorax 2013; 68(1): 91-96.  3. Marva et al. Mandibular advancement devices in 620 men and women with KHAI and snoring: tolerability and predictors of treatment success. Chest 2004; 125: 4014-0875.  4. Janey et al. Oral appliances for snoring and KHAI: a  review. Sleep 2006; 29: 244-262.  5. Yue et al. Oral appliance treatment for KHAI: an update. J Clin Sleep Med 2014; 10(2): 215-227.  6. jesenia Pinedo al. Predictors of OSAH treatment outcome. J Dent Res 2007; 86: 5170-2379.    Surgery:    Surgery for obstructive sleep apnea is considered generally only when other therapies fail to work. Surgery may be discussed with you if you are having a difficult time tolerating CPAP and or when there is an abnormal structure that requires surgical correction.  Nose and throat surgeries often enlarge the airway to prevent collapse.  Most of these surgeries create pain for 1-2 weeks and up to half of the most common surgeries are not effective throughout life.  You should carefully discuss the benefits and drawbacks to surgery with your sleep provider and surgeon to determine if it is the best solution for you.   More information  Surgery for KHAI is directed at areas that are responsible for narrowing or complete obstruction of the airway during sleep.  There are a wide range of procedures available to enlarge and/or stabilize the airway to prevent blockage of breathing in the three major areas where it can occur: the palate, tongue, and nasal regions.  Successful surgical treatment depends on the accurate identification of the factors responsible for obstructive sleep apnea in each person.  A personalized approach is required because there is no single treatment that works well for everyone.  Because of anatomic variation, consultation with an examination by a sleep surgeon is a critical first step in determining what surgical options are best for each patient.  In some cases, examination during sedation may be recommended in order to guide the selection of procedures.  Patients will be counseled about risks and benefits as well as the typical recovery course after surgery. Surgery is typically not a cure for a person s KHAI.  However, surgery will often significantly  improve one s KHAI severity (termed  success rate ).  Even in the absence of a cure, surgery will decrease the cardiovascular risk associated with OSA7; improve overall quality of life8 (sleepiness, functionality, sleep quality, etc).      Palate Procedures:  Patients with KHAI often have narrowing of their airway in the region of their tonsils and uvula.  The goals of palate procedures are to widen the airway in this region as well as to help the tissues resist collapse.  Modern palate procedure techniques focus on tissue conservation and soft tissue rearrangement, rather than tissue removal.  Often the uvula is preserved in this procedure. Residual sleep apnea is common in patient after pharyngoplasty with an average reduction in sleep apnea events of 33%2.      Tongue Procedures:  ExamWhile patients are awake, the muscles that surround the throat are active and keep this region open for breathing. These muscles relax during sleep, allowing the tongue and other structures to collapse and block breathing.  There are several different tongue procedures available.  Selection of a tongue base procedure depends on characteristics seen on physical exam.  Generally, procedures are aimed at removing bulky tissues in this area or preventing the back of the tongue from falling back during sleep.  Success rates for tongue surgery range from 50-62%3.    Hypoglossal Nerve Stimulation:  Hypoglossal nerve stimulation has recently received approval from the [place] Food and Drug Administration for the treatment of obstructive sleep apnea.  This is based on research showing that the system was safe and effective in treating sleep apnea6.  Results showed that the median AHI score decreased 68%, from 29.3 to 9.0. This therapy uses an implant system that senses breathing patterns and delivers mild stimulation to airway muscles, which keeps the airway open during sleep.  The system consists of three fully implanted components: a  small generator (similar in size to a pacemaker), a breathing sensor, and a stimulation lead.  Using a small handheld remote, a patient turns the therapy on before bed and off upon awakening.    Candidates for this device must be greater than 18 years of age, have moderate to severe KHAI (AHI between 15-65), BMI less than 35, have tried CPAP/oral appliance for at least 8 weeks without success, and have appropriate upper airway anatomy (determined by a sleep endoscopy performed by Dr. Awais Cooper).    Hypoglossal Nerve Stimulation Pathway:    The sleep surgeon s office will work with the patient through the insurance prior-authorization process (including communications and appeals).    Nasal Procedures:  Nasal obstruction can interfere with nasal breathing during the day and night.  Studies have shown that relief of nasal obstruction can improve the ability of some patients to tolerate positive airway pressure therapy for obstructive sleep apnea1.  Treatment options include medications such as nasal saline, topical corticosteroid and antihistamine sprays, and oral medications such as antihistamines or decongestants. Non-surgical treatments can include external nasal dilators for selected patients. If these are not successful by themselves, surgery can improve the nasal airway either alone or in combination with these other options.      Combination Procedures:  Combination of surgical procedures and other treatments may be recommended, particularly if patients have more than one area of narrowing or persistent positional disease.  The success rate of combination surgery ranges from 66-80%2,3.    References  1. Norm GAFFNEY. The Role of the Nose in Snoring and Obstructive Sleep Apnoea: An Update.  Eur Arch Otorhinolaryngol. 2011; 268: 1365-73.  2.  Quin SM; Ely JA; Eriberto JR; Pallanch JF; Christiana MORGAN; Maxim JACOB; Berta REYES. Surgical modifications of the upper airway for obstructive sleep apnea in adults: a systematic  review and meta-analysis. SLEEP 2010;33(10):3957-7658. Vj BRADFORD. Hypopharyngeal surgery in obstructive sleep apnea: an evidence-based medicine review.  Arch Otolaryngol Head Neck Surg. 2006 Feb;132(2):206-13.  3. Andrea BRIAN, Gonzalo Y, William DANIEL. The efficacy of anatomically based multilevel surgery for obstructive sleep apnea. Otolaryngol Head Neck Surg. 2003 Oct;129(4):327-35.  4. Kezirian E, Goldberg A. Hypopharyngeal Surgery in Obstructive Sleep Apnea: An Evidence-Based Medicine Review. Arch Otolaryngol Head Neck Surg. 2006 Feb;132(2):206-13.  5. Jenifer DAVALOS et al. Upper-Airway Stimulation for Obstructive Sleep Apnea.  N Engl J Med. 2014 Jan 9;370(2):139-49.  6. Danii Y et al. Increased Incidence of Cardiovascular Disease in Middle-aged Men with Obstructive Sleep Apnea. Am J Respir Crit Care Med; 2002 166: 159-165  7. Gladys EM et al. Studying Life Effects and Effectiveness of Palatopharyngoplasty (SLEEP) study: Subjective Outcomes of Isolated Uvulopalatopharyngoplasty. Otolaryngol Head Neck Surg. 2011; 144: 623-631.  Remember to Drive Safe... Drive Alive     Sleep health profoundly affects your health, mood, and your safety.  Thirty three percent of the population (one in three of us) is not getting enough sleep and many have a sleep disorder. Not getting enough sleep or having an untreated / undertreated sleep condition may make us sleepy without even knowing it. In fact, our driving could be dramatically impaired due to our sleep health. As your provider, here are some things I would like you to know about driving:     Here are some warning signs for impairment and dangerous drowsy driving:              -Having been awake more than 16 hours               -Looking tired               -Eyelid drooping              -Head nodding (it could be too late at this point)              -Driving for more than 30 minutes     Some things you could do to make the driving safer if you are experiencing some drowsiness:               -Stop driving and rest              -Call for transportation              -Make sure your sleep disorder is adequately treated     Some things that have been shown NOT to work when experiencing drowsiness while driving:              -Turning on the radio              -Opening windows              -Eating any  distracting  /  entertaining  foods (e.g., sunflower seeds, candy, or any other)              -Talking on the phone      Your decision may not only impact your life, but also the life of others. Please, remember to drive safe for yourself and all of us.              Your Body mass index is 24.76 kg/m .  Weight management is a personal decision.  If you are interested in exploring weight loss strategies, the following discussion covers the approaches that may be successful. Body mass index (BMI) is one way to tell whether you are at a healthy weight, overweight, or obese. It measures your weight in relation to your height.  A BMI of 18.5 to 24.9 is in the healthy range. A person with a BMI of 25 to 29.9 is considered overweight, and someone with a BMI of 30 or greater is considered obese. More than two-thirds of American adults are considered overweight or obese.  Being overweight or obese increases the risk for further weight gain. Excess weight may lead to heart disease and diabetes.  Creating and following plans for healthy eating and physical activity may help you improve your health.  Weight control is part of healthy lifestyle and includes exercise, emotional health, and healthy eating habits. Careful eating habits lifelong are the mainstay of weight control. Though there are significant health benefits from weight loss, long-term weight loss with diet alone may be very difficult to achieve- studies show long-term success with dietary management in less than 10% of people. Attaining a healthy weight may be especially difficult to achieve in those with severe obesity. In some cases, medications, devices  and surgical management might be considered.  What can you do?  If you are overweight or obese and are interested in methods for weight loss, you should discuss this with your provider.     Consider reducing daily calorie intake by 500 calories.     Keep a food journal.     Avoiding skipping meals, consider cutting portions instead.    Diet combined with exercise helps maintain muscle while optimizing fat loss. Strength training is particularly important for building and maintaining muscle mass. Exercise helps reduce stress, increase energy, and improves fitness. Increasing exercise without diet control, however, may not burn enough calories to loose weight.       Start walking three days a week 10-20 minutes at a time    Work towards walking thirty minutes five days a week     Eventually, increase the speed of your walking for 1-2 minutes at time    In addition, we recommend that you review healthy lifestyles and methods for weight loss available through the National Institutes of Health patient information sites:  http://win.niddk.nih.gov/publications/index.htm    And look into health and wellness programs that may be available through your health insurance provider, employer, local community center, or denzel club.    {Weight Management Plan -- Delete if patient has a normal BMI:[id number]}

## 2023-01-26 NOTE — PROGRESS NOTES
Presenting  History:       Chief Complaint   Patient presents with     Sleep Problem     Discuss CPAP - 30 pound weight loss,  still not feeling rested     Mr. Bassam Jacobsen is a 32 years old male with a medical history significant for ADHD, who presents to clinic for management of obstructive sleep apnea.    For ADHD he is prescribed Adderall XR 30 mg daily.    He had home sleep testing on 5/17/2021 at a weight of 210 pounds.  He had an apnea-hypopnea index of 13/h, with sleep apnea events primarily occurring in supine sleep. AHI was 79.6 per hour supine, 0 per hour prone, 1.4 per hour on left side, and 0 per hour on right side. Percent of time spent: supine -15.7%, prone -8.3%, on left -35.9%, on right -40.1%.Average oxygen saturation was 92.1%.  Time with saturation less than or equal to 88% was 20.5 minutes. The lowest oxygen saturation was 77%.     Patient did not pursue any active interventions after his sleep study, and planned for positional restriction.    He returns to clinic as he has significant insomnia and daytime sleepiness.  He continues to have frequent snoring, witnessed apneas and non restorative sleep.    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  On weekdays he goes to bed around 10:30 PM and wakes up at 6 AM.  He wakes up multiple times during the night and has difficulty maintaining sleep.    Patient complains of feeling excessively tired and sleepy during the day.  On weekends, he takes a nap for 1 to 3 hours, which is refreshing.      He denies restless leg symptoms.  He denies sleepwalking, dream enactment behavior or other parasomnias.  He frequently has a dry mouth in the morning but denies morning headaches.    EPWORTH SLEEPINESS SCALE      [place] Sleepiness Scale ( SHIRIN Hewitt  [id number]<br>ESS - [place]/English - Final version - 21 Nov 07 - [facility].) 1/26/2023   [place] Score (Sleep) 6       INSOMNIA SEVERITY INDEX (CHELSIE)      Insomnia Severity  "Index (CHELSIE) 1/26/2023   Difficulty falling asleep 2   Difficulty staying asleep 3   Problems waking up too early 4   How SATISFIED/DISSATISFIED are you with your CURRENT sleep pattern? 3   How NOTICEABLE to others do you think your sleep problem is in terms of impairing the quality of your life? 2   How WORRIED/DISTRESSED are you about your current sleep problem? 3   To what extent do you consider your sleep problem to INTERFERE with your daily functioning (e.g. daytime fatigue, mood, ability to function at work/daily chores, concentration, memory, mood, etc.) CURRENTLY? 4   CHELSIE Total Score 21       Problem List:  Patient Active Problem List    Diagnosis Date Noted     Controlled substance agreement signed-10/14/21 10/14/2021     Priority: Medium     Acute perforated appendicitis 07/07/2017     Priority: Medium     ACP (advance care planning) 07/05/2017     Priority: Medium              Attention deficit hyperactivity disorder (ADHD), combined type 07/31/2014     Priority: Medium     Diagnosed at [facility]- has been on meds  middle school-high school. Off meds last 3-4 year, was prescribed meds last by Le[facility] Evangelista at [facility]-confirmed with records    Patient is followed by BLAKE KIRAN for ongoing prescription of narcotic pain medicine.  Med: adderall.   Maximum use per month:   Expected duration:   Narcotic agreement on file: YES  Clinic visit recommended: Q 6  months             Baby premature 26-28 weeks 07/31/2014     Priority: Medium     PDA ligation needed       Health Care Home 06/24/2015     Priority: Low          BP 97/60   Pulse 66   Ht 1.816 m (5' 11.5\")   Wt 81.6 kg (180 lb)   SpO2 98%   BMI 24.76 kg/m      Impression/Plan:    1.  Obstructive sleep apnea  2.  Chronic insomnia  3.  Excessive daytime sleepiness  4.  ADHD    Patient has supine positional obstructive sleep apnea based on his home sleep test from 2021 at a weight of 210 pounds.  Overall apnea-hypopnea index " was 13/h and supine AHI was 79.6/h.  Patient did not pursue any active interventions at the time and concentrated on positional restriction and weight management.  His weight is down at 180 pounds.  However patient continues to experience symptoms of sleep apnea including snoring, witnessed apneas, nonrefreshing sleep and excessive daytime sleepiness.  He also has significant sleep maintenance insomnia.  He wants to pursue treatment for his obstructive sleep apnea.  Although repeat testing can reassess the degree of his sleep apnea at this time, based on clinical symptoms, we can pursue treatment.  Management options were reviewed in detail with the patient including CPAP and dental appliance therapy.  Patient declined CPAP at this time and opts for oral appliance therapy.    Plan:     1.  Dental sleep medicine referral for management of mild obstructive sleep apnea  2.  Follow-up once he is established on therapy    I spent a total of 35 minutes for this appointment on this date of service which include time spent before, during and after the visit for chart review, patient care, counseling and coordination of care.      Kain Brock MD    CC:  Morgan Valdez,

## 2023-01-26 NOTE — NURSING NOTE
"Chief Complaint   Patient presents with     Sleep Problem     Discuss CPAP - 30 pound weight loss,  still not feeling rested       Initial BP 97/60   Pulse 66   Ht 1.816 m (5' 11.5\")   Wt 81.6 kg (180 lb)   SpO2 98%   BMI 24.76 kg/m   Estimated body mass index is 24.76 kg/m  as calculated from the following:    Height as of this encounter: 1.816 m (5' 11.5\").    Weight as of this encounter: 81.6 kg (180 lb).    Medication Reconciliation: complete  ESS 6  Malina Hernandez MA  "

## 2023-01-26 NOTE — TELEPHONE ENCOUNTER
Dental referral, sleep study, and clinic notes were faxed to [facility] in [place] using the [phone] fax number.

## 2023-01-31 ENCOUNTER — OFFICE VISIT (OUTPATIENT)
Dept: FAMILY MEDICINE | Facility: CLINIC | Age: 33
End: 2023-01-31

## 2023-01-31 VITALS
TEMPERATURE: 97.1 F | BODY MASS INDEX: 25.28 KG/M2 | WEIGHT: 180.6 LBS | HEIGHT: 71 IN | DIASTOLIC BLOOD PRESSURE: 64 MMHG | HEART RATE: 72 BPM | RESPIRATION RATE: 20 BRPM | SYSTOLIC BLOOD PRESSURE: 102 MMHG

## 2023-01-31 DIAGNOSIS — Z79.899 CONTROLLED SUBSTANCE AGREEMENT SIGNED: ICD-10-CM

## 2023-01-31 DIAGNOSIS — F90.2 ATTENTION DEFICIT HYPERACTIVITY DISORDER (ADHD), COMBINED TYPE: Primary | ICD-10-CM

## 2023-01-31 DIAGNOSIS — G47.33 OSA (OBSTRUCTIVE SLEEP APNEA): ICD-10-CM

## 2023-01-31 PROCEDURE — 99213 OFFICE O/P EST LOW 20 MIN: CPT | Performed by: FAMILY MEDICINE

## 2023-01-31 RX ORDER — DEXTROAMPHETAMINE SACCHARATE, AMPHETAMINE ASPARTATE MONOHYDRATE, DEXTROAMPHETAMINE SULFATE AND AMPHETAMINE SULFATE 7.5; 7.5; 7.5; 7.5 MG/1; MG/1; MG/1; MG/1
30 CAPSULE, EXTENDED RELEASE ORAL DAILY
Qty: 30 CAPSULE | Refills: 0 | Status: SHIPPED | OUTPATIENT
Start: 2023-03-03 | End: 2023-04-02

## 2023-01-31 RX ORDER — DEXTROAMPHETAMINE SACCHARATE, AMPHETAMINE ASPARTATE MONOHYDRATE, DEXTROAMPHETAMINE SULFATE AND AMPHETAMINE SULFATE 7.5; 7.5; 7.5; 7.5 MG/1; MG/1; MG/1; MG/1
30 CAPSULE, EXTENDED RELEASE ORAL DAILY
Qty: 30 CAPSULE | Refills: 0 | Status: SHIPPED | OUTPATIENT
Start: 2023-04-03 | End: 2023-05-03

## 2023-01-31 RX ORDER — DEXTROAMPHETAMINE SACCHARATE, AMPHETAMINE ASPARTATE MONOHYDRATE, DEXTROAMPHETAMINE SULFATE AND AMPHETAMINE SULFATE 7.5; 7.5; 7.5; 7.5 MG/1; MG/1; MG/1; MG/1
30 CAPSULE, EXTENDED RELEASE ORAL DAILY
Qty: 30 CAPSULE | Refills: 0 | Status: SHIPPED | OUTPATIENT
Start: 2023-01-31 | End: 2023-03-02

## 2023-01-31 NOTE — PROGRESS NOTES
"ADHD Follow Up   SUBJECTIVE:     Pt takes 30 mg adderall XR at 7 am    Are your symptoms controlled? yes  Are you satisfied with your current medication dosage? yes  Are you taking your medication regularly? yes  Are you experiencing any insomnia? some  Are you experiencing any jitteriness? no  Are you experiencing any loss of appetite or weight loss? no  Are you experiencing any other side effects? no  ROS:   CONSTITUTIONAL:NEGATIVE   RESP: NEGATIVE   CV: NEGATIVE   NEURO: NEGATIVE  OBJECTIVE:  /64 (BP Location: Left arm, Patient Position: Chair, Cuff Size: Adult Large)   Pulse 72   Temp 97.1  F (36.2  C) (Temporal)   Resp 20   Ht 1.803 m (5' 11\")   Wt 81.9 kg (180 lb 9.6 oz)   BMI 25.19 kg/m    S1 and S2 normal, no murmurs, clicks, gallops or rubs. Regular rate and rhythm. Chest is clear; no wheezes or rales. No edema or JVD.     ASSESSMENT:  Per encounter diagnoses.    PLAN:  Per orders.    (F90.2) Attention deficit hyperactivity disorder (ADHD), combined type  (primary encounter diagnosis)  Comment: well controlled  Plan: continue current medications at current doses     (Z79.899) Controlled substance agreement signed-10/27/22  Comment:   Plan:     (G47.33) KHAI (obstructive sleep apnea)  Comment: mild KHAI-seeing sleep docs  Plan:    "

## 2023-01-31 NOTE — NURSING NOTE
Bassam Jacobsen is here for a medication check and refill.    Questioned patient about current smoking habits.  Pt. quit smoking some time ago.  PULSE regular  My Chart: active  CLASSIFICATION OF OVERWEIGHT AND OBESITY BY BMI                        Obesity Class           BMI(kg/m2)  Underweight                                    < 18.5  Normal                                         18.5-24.9  Overweight                                     25.0-29.9  OBESITY                     I                  30.0-34.9                             II                 35.0-39.9  EXTREME OBESITY             III                >40                            Patient's  BMI Body mass index is 25.19 kg/m .  http://hin.nhlbi.nih.gov/menuplanner/menu.cgi  Pre-visit planning  Immunizations - up to date  Colonoscopy -   Mammogram -   Asthma -   PHQ9 -    GRAHAM-7 -

## 2023-03-26 ENCOUNTER — HEALTH MAINTENANCE LETTER (OUTPATIENT)
Age: 33
End: 2023-03-26

## 2023-05-31 ENCOUNTER — OFFICE VISIT (OUTPATIENT)
Dept: FAMILY MEDICINE | Facility: CLINIC | Age: 33
End: 2023-05-31

## 2023-05-31 VITALS
BODY MASS INDEX: 24.69 KG/M2 | HEART RATE: 64 BPM | HEIGHT: 71 IN | TEMPERATURE: 97 F | RESPIRATION RATE: 20 BRPM | SYSTOLIC BLOOD PRESSURE: 112 MMHG | DIASTOLIC BLOOD PRESSURE: 60 MMHG | WEIGHT: 176.4 LBS

## 2023-05-31 DIAGNOSIS — Z79.899 CONTROLLED SUBSTANCE AGREEMENT SIGNED: ICD-10-CM

## 2023-05-31 DIAGNOSIS — F90.2 ATTENTION DEFICIT HYPERACTIVITY DISORDER (ADHD), COMBINED TYPE: Primary | ICD-10-CM

## 2023-05-31 PROCEDURE — 99213 OFFICE O/P EST LOW 20 MIN: CPT | Performed by: FAMILY MEDICINE

## 2023-05-31 RX ORDER — DEXTROAMPHETAMINE SACCHARATE, AMPHETAMINE ASPARTATE MONOHYDRATE, DEXTROAMPHETAMINE SULFATE AND AMPHETAMINE SULFATE 7.5; 7.5; 7.5; 7.5 MG/1; MG/1; MG/1; MG/1
30 CAPSULE, EXTENDED RELEASE ORAL DAILY
Qty: 30 CAPSULE | Refills: 0 | Status: SHIPPED | OUTPATIENT
Start: 2023-08-01 | End: 2023-08-31

## 2023-05-31 RX ORDER — DEXTROAMPHETAMINE SACCHARATE, AMPHETAMINE ASPARTATE MONOHYDRATE, DEXTROAMPHETAMINE SULFATE AND AMPHETAMINE SULFATE 7.5; 7.5; 7.5; 7.5 MG/1; MG/1; MG/1; MG/1
30 CAPSULE, EXTENDED RELEASE ORAL DAILY
Qty: 30 CAPSULE | Refills: 0 | Status: SHIPPED | OUTPATIENT
Start: 2023-05-31 | End: 2023-06-30

## 2023-05-31 RX ORDER — DEXTROAMPHETAMINE SACCHARATE, AMPHETAMINE ASPARTATE MONOHYDRATE, DEXTROAMPHETAMINE SULFATE AND AMPHETAMINE SULFATE 7.5; 7.5; 7.5; 7.5 MG/1; MG/1; MG/1; MG/1
30 CAPSULE, EXTENDED RELEASE ORAL DAILY
Qty: 30 CAPSULE | Refills: 0 | Status: SHIPPED | OUTPATIENT
Start: 2023-07-01 | End: 2023-07-31

## 2023-05-31 NOTE — NURSING NOTE
Bassam Jacobsen is here for a medication check and refill.    Questioned patient about current smoking habits.  Pt. quit smoking some time ago.  PULSE regular  My Chart: active  CLASSIFICATION OF OVERWEIGHT AND OBESITY BY BMI                        Obesity Class           BMI(kg/m2)  Underweight                                    < 18.5  Normal                                         18.5-24.9  Overweight                                     25.0-29.9  OBESITY                     I                  30.0-34.9                             II                 35.0-39.9  EXTREME OBESITY             III                >40                            Patient's  BMI Body mass index is 24.6 kg/m .  http://hin.nhlbi.nih.gov/menuplanner/menu.cgi  Pre-visit planning  Immunizations - up to date  Colonoscopy -   Mammogram -   Asthma -   PHQ9 -    GRAHAM-7 -      The patient has verbalized that it is ok to leave a detailed voice message on the patient's cell phone with results/recommendations from this visit.

## 2023-05-31 NOTE — PROGRESS NOTES
"ADHD Follow Up   SUBJECTIVE:     Are your symptoms controlled? yes  Are you satisfied with your current medication dosage? yes  Are you taking your medication regularly? yes  Are you experiencing any insomnia? no  Are you experiencing any jitteriness? no  Are you experiencing any loss of appetite or weight loss? no  Are you experiencing any other side effects? no  ROS:   CONSTITUTIONAL:NEGATIVE   RESP: NEGATIVE   CV: NEGATIVE   NEURO: NEGATIVE  OBJECTIVE:  /60 (BP Location: Right arm, Patient Position: Chair, Cuff Size: Adult Regular)   Pulse 64   Temp 97  F (36.1  C) (Temporal)   Resp 20   Ht 1.803 m (5' 11\")   Wt 80 kg (176 lb 6.4 oz)   BMI 24.60 kg/m    S1 and S2 normal, no murmurs, clicks, gallops or rubs. Regular rate and rhythm. Chest is clear; no wheezes or rales. No edema or JVD.     ASSESSMENT:  Per encounter diagnoses.    PLAN:  Per orders.    (F90.2) Attention deficit hyperactivity disorder (ADHD), combined type  (primary encounter diagnosis)  Comment: well controlled, PDMP reviewed, no signs diversion  Plan: continue current medications at current doses     (Z79.899) Controlled substance agreement signed-10/27/22  Comment:   Plan:    "

## 2023-07-21 ENCOUNTER — TELEPHONE (OUTPATIENT)
Dept: SLEEP MEDICINE | Facility: CLINIC | Age: 33
End: 2023-07-21
Payer: COMMERCIAL

## 2023-07-21 DIAGNOSIS — G47.33 OSA (OBSTRUCTIVE SLEEP APNEA): Primary | ICD-10-CM

## 2023-07-21 DIAGNOSIS — G47.19 EXCESSIVE DAYTIME SLEEPINESS: ICD-10-CM

## 2023-07-21 NOTE — TELEPHONE ENCOUNTER
Phone call made to patient to discuss management of sleep apnea.    He had home sleep testing on 5/17/2021 at a weight of 210 pounds.  He had an apnea-hypopnea index of 13/h, with sleep apnea events primarily occurring in supine sleep. AHI was 79.6 per hour supine, 0 per hour prone, 1.4 per hour on left side, and 0 per hour on right side. Percent of time spent: supine -15.7%, prone -8.3%, on left -35.9%, on right -40.1%.Average oxygen saturation was 92.1%.  Time with saturation less than or equal to 88% was 20.5 minutes. The lowest oxygen saturation was 77%.     Although patient considered dental appliance therapy, he decided against treatment after meeting with the dental sleep specialist.    His weight is down, but he continues to have snoring, witnessed apneas and daytime sleepiness.  He also reports sleep disturbance and sleep maintenance insomnia.    Patient wants to consider starting CPAP therapy to address sleep disturbance and daytime sleepiness.  We reviewed option of CPAP therapy and decided to start treatment.    Impression & Plan:    1.  Obstructive sleep apnea  2.  Excessive daytime sleepiness    Plan:    1.  Start auto titrating CPAP therapy with a pressure range of 5 to 15 cm H2O  2.  Patient should have appropriate clinical follow-up in 2 months after starting treatment to monitor response, compliance and CPAP download data.    Dr. Kain Brock

## 2024-01-04 ENCOUNTER — OFFICE VISIT (OUTPATIENT)
Dept: FAMILY MEDICINE | Facility: CLINIC | Age: 34
End: 2024-01-04

## 2024-01-04 VITALS
BODY MASS INDEX: 27.13 KG/M2 | RESPIRATION RATE: 20 BRPM | TEMPERATURE: 97.3 F | DIASTOLIC BLOOD PRESSURE: 60 MMHG | HEART RATE: 64 BPM | WEIGHT: 193.8 LBS | HEIGHT: 71 IN | SYSTOLIC BLOOD PRESSURE: 122 MMHG

## 2024-01-04 DIAGNOSIS — Z79.899 CONTROLLED SUBSTANCE AGREEMENT SIGNED: ICD-10-CM

## 2024-01-04 DIAGNOSIS — G47.33 OSA (OBSTRUCTIVE SLEEP APNEA): ICD-10-CM

## 2024-01-04 DIAGNOSIS — F90.2 ATTENTION DEFICIT HYPERACTIVITY DISORDER (ADHD), COMBINED TYPE: Primary | ICD-10-CM

## 2024-01-04 DIAGNOSIS — R09.81 CHRONIC NASAL CONGESTION: ICD-10-CM

## 2024-01-04 PROCEDURE — 99213 OFFICE O/P EST LOW 20 MIN: CPT | Performed by: FAMILY MEDICINE

## 2024-01-04 RX ORDER — DEXTROAMPHETAMINE SACCHARATE, AMPHETAMINE ASPARTATE MONOHYDRATE, DEXTROAMPHETAMINE SULFATE AND AMPHETAMINE SULFATE 7.5; 7.5; 7.5; 7.5 MG/1; MG/1; MG/1; MG/1
30 CAPSULE, EXTENDED RELEASE ORAL DAILY
Qty: 30 CAPSULE | Refills: 0 | Status: SHIPPED | OUTPATIENT
Start: 2024-01-04 | End: 2024-02-03

## 2024-01-04 RX ORDER — DEXTROAMPHETAMINE SACCHARATE, AMPHETAMINE ASPARTATE MONOHYDRATE, DEXTROAMPHETAMINE SULFATE AND AMPHETAMINE SULFATE 7.5; 7.5; 7.5; 7.5 MG/1; MG/1; MG/1; MG/1
30 CAPSULE, EXTENDED RELEASE ORAL DAILY
Qty: 30 CAPSULE | Refills: 0 | Status: SHIPPED | OUTPATIENT
Start: 2024-03-06 | End: 2024-04-05

## 2024-01-04 RX ORDER — DEXTROAMPHETAMINE SACCHARATE, AMPHETAMINE ASPARTATE MONOHYDRATE, DEXTROAMPHETAMINE SULFATE AND AMPHETAMINE SULFATE 7.5; 7.5; 7.5; 7.5 MG/1; MG/1; MG/1; MG/1
30 CAPSULE, EXTENDED RELEASE ORAL DAILY
Qty: 30 CAPSULE | Refills: 0 | Status: SHIPPED | OUTPATIENT
Start: 2024-02-04 | End: 2024-03-05

## 2024-01-04 NOTE — PROGRESS NOTES
"ADHD Follow Up   SUBJECTIVE:     Are your symptoms controlled? yes  Are you satisfied with your current medication dosage? yes  Are you taking your medication regularly? yes  Are you experiencing any insomnia? No, now has cpap nd sleeping better  Are you experiencing any jitteriness? no  Are you experiencing any loss of appetite or weight loss? no  Are you experiencing any other side effects? no  ROS:   CONSTITUTIONAL:NEGATIVE   RESP: NEGATIVE   CV: NEGATIVE   NEURO: NEGATIVE  OBJECTIVE:  /60 (BP Location: Right arm, Patient Position: Chair, Cuff Size: Adult Regular)   Pulse 64   Temp 97.3  F (36.3  C) (Temporal)   Resp 20   Ht 1.803 m (5' 11\")   Wt 87.9 kg (193 lb 12.8 oz)   BMI 27.03 kg/m    S1 and S2 normal, no murmurs, clicks, gallops or rubs. Regular rate and rhythm. Chest is clear; no wheezes or rales. No edema or JVD.     ASSESSMENT:  Per encounter diagnoses.    PLAN:  Per orders.    (F90.2) Attention deficit hyperactivity disorder (ADHD), combined type  (primary encounter diagnosis)  Comment: well controlled, PDMP reviewed  Plan: continue current medications at current doses     (Z79.899) Controlled substance agreement signed-10/27/22  Comment:   Plan:     (G47.33) KHAI (obstructive sleep apnea)  Comment: cpap  Plan:       (R09.81) Chronic nasal congestion  Comment: pt has had blockage of nose for years, always mouth breathing- thinks he may have septaldeviation issues, wants ot get evaluated   Plan: Adult ENT  Referral - To a [facility] Location (Use POS/Location)              "

## 2024-01-04 NOTE — NURSING NOTE
Bassam Jacobsen is here for a medication check and refill.    Questioned patient about current smoking habits.  Pt. quit smoking some time ago.  PULSE regular  My Chart: active  CLASSIFICATION OF OVERWEIGHT AND OBESITY BY BMI                        Obesity Class           BMI(kg/m2)  Underweight                                    < 18.5  Normal                                         18.5-24.9  Overweight                                     25.0-29.9  OBESITY                     I                  30.0-34.9                             II                 35.0-39.9  EXTREME OBESITY             III                >40                            Patient's  BMI Body mass index is 27.03 kg/m .  http://hin.nhlbi.nih.gov/menuplanner/menu.cgi  Pre-visit planning  Immunizations - up to date  Colonoscopy -   Mammogram -   Asthma -   PHQ9 -    GRAHAM-7 -      The patient has verbalized that it is ok to leave a detailed voice message on the patient's cell phone with results/recommendations from this visit.

## 2024-01-11 NOTE — TELEPHONE ENCOUNTER
Medical screening examination initiated.  I have conducted a focused provider triage encounter, findings are as follows:    Brief history of present illness:  Chest pain radiating to the left arm starting last night.  Denies pain at this time.    There were no vitals filed for this visit.    Pertinent physical exam:  No acute distress noted.    Brief workup plan:  Cardiac workup    Preliminary workup initiated; this workup will be continued and followed by the physician or advanced practice provider that is assigned to the patient when roomed.   Talked to Ady's wife who informed me he is [facility] which is what their other clinic suggested. Will wait for further updates from them.

## 2024-06-01 ENCOUNTER — HEALTH MAINTENANCE LETTER (OUTPATIENT)
Age: 34
End: 2024-06-01

## 2025-06-14 ENCOUNTER — HEALTH MAINTENANCE LETTER (OUTPATIENT)
Age: 35
End: 2025-06-14

## (undated) DEVICE — SOL NACL 0.9% INJ 1000ML BAG 2B1324X

## (undated) DEVICE — GLOVE PROTEXIS POWDER FREE 8.0 ORTHOPEDIC 2D73ET80

## (undated) DEVICE — ESU PENCIL W/HOLSTER E2350H

## (undated) DEVICE — DRSG STERI STRIP 1/2X4" R1547

## (undated) DEVICE — LINEN POUCH DBL 5427

## (undated) DEVICE — STPL ENDO GIA 12MM UNIV 030449

## (undated) DEVICE — SUCTION CANISTER STRAW 65652-008

## (undated) DEVICE — SU VICRYL 0 CT-2 CR 8X18" J727D

## (undated) DEVICE — LINEN FULL SHEET 5511

## (undated) DEVICE — ENDO POUCH GOLD 10MM ECATCH 173050G

## (undated) DEVICE — SOL NACL 0.9% IRRIG 1000ML BOTTLE 2F7124

## (undated) DEVICE — PREP CHLORAPREP 26ML TINTED ORANGE  260815

## (undated) DEVICE — SU VICRYL 4-0 P-3 18" UND J494G

## (undated) DEVICE — ENDO TROCAR BLUNT 100MM W/THRD ANCHOR BLUNTPORT BPT12STS

## (undated) DEVICE — ENDO TROCAR 05MM VERSAONE BLADELESS W/STD FIX CAN NONB5STF

## (undated) DEVICE — Device

## (undated) DEVICE — BLADE CLIPPER 3M 9670

## (undated) DEVICE — LINEN HALF SHEET 5512

## (undated) DEVICE — ESU CORD MONOPOLAR 10'  E0510

## (undated) DEVICE — SOL ADH LIQUID BENZOIN SWAB 0.6ML C1544

## (undated) DEVICE — STPL ENDO RELOAD GIA 45X2MM ROTIC 030453

## (undated) DEVICE — SUCTION CANISTER MEDIVAC LINER 3000ML W/LID 65651-530

## (undated) DEVICE — GLOVE PROTEXIS POWDER FREE SMT 7.5  2D72PT75X

## (undated) DEVICE — STPL ENDO RELOAD 45MM VASCULAR MEDIUM TAN EGIA45AVM

## (undated) DEVICE — ESU ELEC BLADE 2.75" COATED/INSULATED E1455

## (undated) DEVICE — ENDO CANNULA 05MM VERSAONE UNIVERSAL UNVCA5STF

## (undated) DEVICE — DECANTER VIAL 2006S

## (undated) DEVICE — ESU GROUND PAD ADULT W/CORD E7507

## (undated) DEVICE — SUCTION IRR STRYKERFLOW II W/TIP 250-070-520

## (undated) DEVICE — BAG CLEAR TRASH 1.3M 39X33" P4040C

## (undated) DEVICE — LINEN TOWEL PACK X10 5473

## (undated) RX ORDER — LIDOCAINE HYDROCHLORIDE 10 MG/ML
INJECTION, SOLUTION EPIDURAL; INFILTRATION; INTRACAUDAL; PERINEURAL
Status: DISPENSED
Start: 2017-07-06

## (undated) RX ORDER — PROPOFOL 10 MG/ML
INJECTION, EMULSION INTRAVENOUS
Status: DISPENSED
Start: 2017-07-06

## (undated) RX ORDER — ONDANSETRON 2 MG/ML
INJECTION INTRAMUSCULAR; INTRAVENOUS
Status: DISPENSED
Start: 2017-07-06

## (undated) RX ORDER — FENTANYL CITRATE 50 UG/ML
INJECTION, SOLUTION INTRAMUSCULAR; INTRAVENOUS
Status: DISPENSED
Start: 2017-07-07

## (undated) RX ORDER — DEXAMETHASONE SODIUM PHOSPHATE 4 MG/ML
INJECTION, SOLUTION INTRA-ARTICULAR; INTRALESIONAL; INTRAMUSCULAR; INTRAVENOUS; SOFT TISSUE
Status: DISPENSED
Start: 2017-07-06

## (undated) RX ORDER — BUPIVACAINE HYDROCHLORIDE 5 MG/ML
INJECTION, SOLUTION EPIDURAL; INTRACAUDAL
Status: DISPENSED
Start: 2017-07-06

## (undated) RX ORDER — GLYCOPYRROLATE 0.2 MG/ML
INJECTION INTRAMUSCULAR; INTRAVENOUS
Status: DISPENSED
Start: 2017-07-06

## (undated) RX ORDER — FENTANYL CITRATE 50 UG/ML
INJECTION, SOLUTION INTRAMUSCULAR; INTRAVENOUS
Status: DISPENSED
Start: 2017-07-06